# Patient Record
Sex: FEMALE | Race: BLACK OR AFRICAN AMERICAN | NOT HISPANIC OR LATINO | URBAN - METROPOLITAN AREA
[De-identification: names, ages, dates, MRNs, and addresses within clinical notes are randomized per-mention and may not be internally consistent; named-entity substitution may affect disease eponyms.]

---

## 2018-03-13 ENCOUNTER — APPOINTMENT (RX ONLY)
Dept: URBAN - METROPOLITAN AREA CLINIC 15 | Facility: CLINIC | Age: 23
Setting detail: DERMATOLOGY
End: 2018-03-13

## 2018-03-13 DIAGNOSIS — L81.0 POSTINFLAMMATORY HYPERPIGMENTATION: ICD-10-CM

## 2018-03-13 DIAGNOSIS — L73.9 FOLLICULAR DISORDER, UNSPECIFIED: ICD-10-CM

## 2018-03-13 DIAGNOSIS — L738 OTHER SPECIFIED DISEASES OF HAIR AND HAIR FOLLICLES: ICD-10-CM

## 2018-03-13 DIAGNOSIS — L70.0 ACNE VULGARIS: ICD-10-CM

## 2018-03-13 DIAGNOSIS — L663 OTHER SPECIFIED DISEASES OF HAIR AND HAIR FOLLICLES: ICD-10-CM

## 2018-03-13 PROBLEM — L85.3 XEROSIS CUTIS: Status: ACTIVE | Noted: 2018-03-13

## 2018-03-13 PROBLEM — L02.221 FURUNCLE OF ABDOMINAL WALL: Status: ACTIVE | Noted: 2018-03-13

## 2018-03-13 PROCEDURE — 99213 OFFICE O/P EST LOW 20 MIN: CPT

## 2018-03-13 PROCEDURE — ? COUNSELING

## 2018-03-13 PROCEDURE — ? PRESCRIPTION

## 2018-03-13 PROCEDURE — ? TREATMENT REGIMEN

## 2018-03-13 RX ORDER — HYDROCORTISONE 25 MG/G
CREAM TOPICAL
Qty: 1 | Refills: 1 | Status: ERX | COMMUNITY
Start: 2018-03-13

## 2018-03-13 RX ORDER — CLINDAMYCIN PHOSPHATE 10 MG/ML
SOLUTION TOPICAL
Qty: 1 | Refills: 2 | Status: ERX | COMMUNITY
Start: 2018-03-13

## 2018-03-13 RX ADMIN — HYDROCORTISONE: 25 CREAM TOPICAL at 00:00

## 2018-03-13 RX ADMIN — CLINDAMYCIN PHOSPHATE: 10 SOLUTION TOPICAL at 00:00

## 2018-03-13 ASSESSMENT — LOCATION SIMPLE DESCRIPTION DERM
LOCATION SIMPLE: RIGHT FOREHEAD
LOCATION SIMPLE: LEFT CHEEK
LOCATION SIMPLE: RIGHT CHEEK
LOCATION SIMPLE: CHIN
LOCATION SIMPLE: GROIN

## 2018-03-13 ASSESSMENT — LOCATION ZONE DERM
LOCATION ZONE: FACE
LOCATION ZONE: TRUNK

## 2018-03-13 ASSESSMENT — LOCATION DETAILED DESCRIPTION DERM
LOCATION DETAILED: LEFT MENTAL CREASE
LOCATION DETAILED: RIGHT INFERIOR CENTRAL MALAR CHEEK
LOCATION DETAILED: LEFT INFERIOR CENTRAL MALAR CHEEK
LOCATION DETAILED: LEFT SUPRAPUBIC SKIN
LOCATION DETAILED: RIGHT SUPRAPUBIC SKIN
LOCATION DETAILED: RIGHT MEDIAL FOREHEAD

## 2018-03-13 NOTE — PROCEDURE: COUNSELING
Topical Retinoid counseling:  Patient advised to apply a pea-sized amount only at bedtime and wait 30 minutes after washing their face before applying. If too drying, patient may add a non-comedogenic moisturizer. The patient verbalized understanding of the proper use and possible adverse effects of retinoids. All of the patient's questions and concerns were addressed.
Use Enhanced Medication Counseling?: No
Bactrim Counseling:  I discussed with the patient the risks of sulfa antibiotics including but not limited to GI upset, allergic reaction, drug rash, diarrhea, dizziness, photosensitivity, and yeast infections. Rarely, more serious reactions can occur including but not limited to aplastic anemia, agranulocytosis, methemoglobinemia, blood dyscrasias, liver or kidney failure, lung infiltrates or desquamative/blistering drug rashes.
Erythromycin Pregnancy And Lactation Text: This medication is Pregnancy Category B and is considered safe during pregnancy. It is also excreted in breast milk.
Doxycycline Pregnancy And Lactation Text: This medication is Pregnancy Category D and not consider safe during pregnancy. It is also excreted in breast milk but is considered safe for shorter treatment courses.
Doxycycline Counseling:  Patient counseled regarding possible photosensitivity and increased risk for sunburn. Patient instructed to avoid sunlight, if possible. When exposed to sunlight, patients should wear protective clothing, sunglasses, and sunscreen. The patient was instructed to call the office immediately if the following severe adverse effects occur:  hearing changes, easy bruising/bleeding, severe headache, or vision changes. The patient verbalized understanding of the proper use and possible adverse effects of doxycycline. All of the patient's questions and concerns were addressed.
Tetracycline Pregnancy And Lactation Text: This medication is Pregnancy Category D and not consider safe during pregnancy. It is also excreted in breast milk.
Benzoyl Peroxide Counseling: Patient counseled that medicine may cause skin irritation and bleach clothing. In the event of skin irritation, the patient was advised to reduce the amount of the drug applied or use it less frequently. The patient verbalized understanding of the proper use and possible adverse effects of benzoyl peroxide. All of the patient's questions and concerns were addressed.
Birth Control Pills Counseling: Birth Control Pill Counseling: I discussed with the patient the potential side effects of OCPs including but not limited to increased risk of stroke, heart attack, thrombophlebitis, deep venous thrombosis, hepatic adenomas, breast changes, GI upset, headaches, and depression. The patient verbalized understanding of the proper use and possible adverse effects of OCPs. All of the patient's questions and concerns were addressed.
Topical Sulfur Applications Pregnancy And Lactation Text: This medication is Pregnancy Category C and has an unknown safety profile during pregnancy. It is unknown if this topical medication is excreted in breast milk.
Isotretinoin Counseling: Patient should get monthly blood tests, not donate blood, not drive at night if vision affected, not share medication, and not undergo elective surgery for 6 months after tx completed. Side effects reviewed, pt to contact office should one occur.
Benzoyl Peroxide Pregnancy And Lactation Text: This medication is Pregnancy Category C. It is unknown if benzoyl peroxide is excreted in breast milk.
High Dose Vitamin A Counseling: Side effects reviewed, pt to contact office should one occur.
Azithromycin Counseling:  I discussed with the patient the risks of azithromycin including but not limited to GI upset, allergic reaction, drug rash, diarrhea, and yeast infections.
Dapsone Counseling: I discussed with the patient the risks of dapsone including but not limited to hemolytic anemia, agranulocytosis, rashes, methemoglobinemia, kidney failure, peripheral neuropathy, headaches, GI upset, and liver toxicity. Patients who start dapsone require monitoring including baseline LFTs and weekly CBCs for the first month, then every month thereafter. The patient verbalized understanding of the proper use and possible adverse effects of dapsone. All of the patient's questions and concerns were addressed.
Topical Clindamycin Counseling: Patient counseled that this medication may cause skin irritation or allergic reactions. In the event of skin irritation, the patient was advised to reduce the amount of the drug applied or use it less frequently. The patient verbalized understanding of the proper use and possible adverse effects of clindamycin. All of the patient's questions and concerns were addressed.
Isotretinoin Pregnancy And Lactation Text: This medication is Pregnancy Category X and is considered extremely dangerous during pregnancy. It is unknown if it is excreted in breast milk.
Tetracycline Counseling: Patient counseled regarding possible photosensitivity and increased risk for sunburn. Patient instructed to avoid sunlight, if possible. When exposed to sunlight, patients should wear protective clothing, sunglasses, and sunscreen. The patient was instructed to call the office immediately if the following severe adverse effects occur:  hearing changes, easy bruising/bleeding, severe headache, or vision changes. The patient verbalized understanding of the proper use and possible adverse effects of tetracycline. All of the patient's questions and concerns were addressed. Patient understands to avoid pregnancy while on therapy due to potential birth defects.
Azithromycin Pregnancy And Lactation Text: This medication is considered safe during pregnancy and is also secreted in breast milk.
Spironolactone Pregnancy And Lactation Text: This medication can cause feminization of the male fetus and should be avoided during pregnancy. The active metabolite is also found in breast milk.
Birth Control Pills Pregnancy And Lactation Text: This medication should be avoided if pregnant and for the first 30 days post-partum.
Topical Retinoid Pregnancy And Lactation Text: This medication is Pregnancy Category C. It is unknown if this medication is excreted in breast milk.
Topical Sulfur Applications Counseling: Topical Sulfur Counseling: Patient counseled that this medication may cause skin irritation or allergic reactions. In the event of skin irritation, the patient was advised to reduce the amount of the drug applied or use it less frequently. The patient verbalized understanding of the proper use and possible adverse effects of topical sulfur application. All of the patient's questions and concerns were addressed.
Bactrim Pregnancy And Lactation Text: This medication is Pregnancy Category D and is known to cause fetal risk. It is also excreted in breast milk.
Minocycline Counseling: Patient advised regarding possible photosensitivity and discoloration of the teeth, skin, lips, tongue and gums. Patient instructed to avoid sunlight, if possible. When exposed to sunlight, patients should wear protective clothing, sunglasses, and sunscreen. The patient was instructed to call the office immediately if the following severe adverse effects occur:  hearing changes, easy bruising/bleeding, severe headache, or vision changes. The patient verbalized understanding of the proper use and possible adverse effects of minocycline. All of the patient's questions and concerns were addressed.
Topical Clindamycin Pregnancy And Lactation Text: This medication is Pregnancy Category B and is considered safe during pregnancy. It is unknown if it is excreted in breast milk.
Erythromycin Counseling:  I discussed with the patient the risks of erythromycin including but not limited to GI upset, allergic reaction, drug rash, diarrhea, increase in liver enzymes, and yeast infections.
Detail Level: Zone
Tazorac Counseling:  Patient advised that medication is irritating and drying. Patient may need to apply sparingly and wash off after an hour before eventually leaving it on overnight. The patient verbalized understanding of the proper use and possible adverse effects of tazorac. All of the patient's questions and concerns were addressed.
Spironolactone Counseling: Patient advised regarding risks of diarrhea, abdominal pain, hyperkalemia, birth defects (for female patients), liver toxicity and renal toxicity. The patient may need blood work to monitor liver and kidney function and potassium levels while on therapy. The patient verbalized understanding of the proper use and possible adverse effects of spironolactone. All of the patient's questions and concerns were addressed.
Tazorac Pregnancy And Lactation Text: This medication is not safe during pregnancy. It is unknown if this medication is excreted in breast milk.
Dapsone Pregnancy And Lactation Text: This medication is Pregnancy Category C and is not considered safe during pregnancy or breast feeding.
High Dose Vitamin A Pregnancy And Lactation Text: High dose vitamin A therapy is contraindicated during pregnancy and breast feeding.
Detail Level: Simple

## 2018-03-13 NOTE — HPI: PIMPLES (ACNE)
How Severe Is Your Acne?: mild
Is This A New Presentation, Or A Follow-Up?: Follow Up Acne
Additional Comments (Use Complete Sentences): Last time patient was here was on 7/18/2016

## 2018-03-13 NOTE — HPI: INFECTION (FOLLICULITIS)
How Severe Is It?: mild
Is This A New Presentation, Or A Follow-Up?: Follow Up Folliculitis
Additional History: Last time patient was here 7/18/2016

## 2018-03-13 NOTE — PROCEDURE: TREATMENT REGIMEN
Detail Level: Zone
Continue Regimen: .\\nSun protection daily. \\n\\nMild cleanser at night. \\nSmoothing Retinol 2X at night.
Initiate Treatment: .\\nHydrocortisone 2.5 % cream every morning for 2 weeks. Use it every other 2 weeks. \\nSun protection daily \\n\\nMild cleanser at night \\nAdvanced Skin Brightening 8 % cream at night \\nSmoothing Retinol 2 X at night.

## 2018-04-04 ENCOUNTER — RX ONLY (OUTPATIENT)
Age: 23
Setting detail: RX ONLY
End: 2018-04-04

## 2018-04-04 RX ORDER — FLUTICASONE PROPIONATE 0.5 MG/ML
LOTION TOPICAL
Qty: 1 | Refills: 0 | Status: ERX | COMMUNITY
Start: 2018-04-04

## 2018-04-06 ENCOUNTER — RX ONLY (OUTPATIENT)
Age: 23
Setting detail: RX ONLY
End: 2018-04-06

## 2018-04-06 RX ORDER — FLUTICASONE PROPIONATE 0.5 MG/ML
LOTION TOPICAL
Qty: 1 | Refills: 0 | Status: ERX

## 2021-05-04 ENCOUNTER — APPOINTMENT (RX ONLY)
Dept: URBAN - METROPOLITAN AREA CLINIC 15 | Facility: CLINIC | Age: 26
Setting detail: DERMATOLOGY
End: 2021-05-04

## 2021-05-04 VITALS — WEIGHT: 120 LBS | HEIGHT: 56 IN

## 2021-05-04 DIAGNOSIS — L81.0 POSTINFLAMMATORY HYPERPIGMENTATION: ICD-10-CM

## 2021-05-04 DIAGNOSIS — L70.0 ACNE VULGARIS: ICD-10-CM

## 2021-05-04 PROCEDURE — ? COUNSELING

## 2021-05-04 PROCEDURE — ? PRESCRIPTION MEDICATION MANAGEMENT

## 2021-05-04 PROCEDURE — 99204 OFFICE O/P NEW MOD 45 MIN: CPT

## 2021-05-04 PROCEDURE — ? PRESCRIPTION

## 2021-05-04 RX ORDER — TRIFAROTENE 50 UG/G
1 CREAM TOPICAL QHS
Qty: 45 | Refills: 3 | Status: ERX | COMMUNITY
Start: 2021-05-04

## 2021-05-04 RX ORDER — PHARMACY COMPOUNDING ACCESSORY
1 EACH MISCELLANEOUS QHS
Qty: 1 | Refills: 0 | Status: ERX | COMMUNITY
Start: 2021-05-04

## 2021-05-04 RX ADMIN — Medication 1: at 00:00

## 2021-05-04 RX ADMIN — TRIFAROTENE 1: 50 CREAM TOPICAL at 00:00

## 2021-05-04 ASSESSMENT — LOCATION SIMPLE DESCRIPTION DERM
LOCATION SIMPLE: RIGHT CHEEK
LOCATION SIMPLE: LEFT CHEEK

## 2021-05-04 ASSESSMENT — LOCATION ZONE DERM: LOCATION ZONE: FACE

## 2021-05-04 ASSESSMENT — LOCATION DETAILED DESCRIPTION DERM
LOCATION DETAILED: LEFT INFERIOR CENTRAL MALAR CHEEK
LOCATION DETAILED: RIGHT CENTRAL MALAR CHEEK

## 2021-05-04 NOTE — PROCEDURE: PRESCRIPTION MEDICATION MANAGEMENT
Initiate Treatment: Keith Everts face with Skinceuticals simply clean gel or La roche posay purifying foaming cleanser \\n-Apply Skinceuticals metacel renewal b3 to the entire face or CeraVe PM \\n-Apply SPF (recommend Colorscience flex) \\n\\nPM\\n-Wash face with Skinceuticals simply clean gel or [place] roche posay purifying foaming cleanser \\n-Apply Skinceuticals metacel renewal b3 to the entire face or CeraVe PM \\n-Apply pea sized amount of Aklief ([address]) cream to the entire face every other night, gradually increase as tolerated (avoid eyelid area and corners of the mouth) \\n-Apply HQ 8%, NA 5%, KA 3%, TA 5% cream to the entire face every night ([facility]) \\n\\n\\n*drosperinone based birth control, patient will discuss starting with GYN*\\n**Avoid foods high in dairy and glycemic index**
Discontinue Regimen: .\\n-Sunday Vernida Carlos products
Plan: .\\n-Vi peel precision plus $300 once a month.  Discontinue all acne/pigmentation medications 3-5 days prior to appointment
Render In Strict Bullet Format?: No
Detail Level: Simple
Initiate Treatment: .\\n-Apply HQ 8%, NA 5%, KA 3%, TA 5% cream to the entire face every night ([facility])

## 2021-05-04 NOTE — HPI: PIMPLES (ACNE)
What Type Of Note Output Would You Prefer (Optional)?: Standard Output
How Severe Is Your Acne?: moderate
Is This A New Presentation, Or A Follow-Up?: Acne
Females Only: When Was Your Last Menstrual Period?: 4/2021

## 2021-05-04 NOTE — PROCEDURE: COUNSELING
Bleaching Agents Recommendations: Compound HQ cream
Detail Level: Zone
Sunscreen Recommendations: Daily sun screen with at least
Erythromycin Counseling:  I discussed with the patient the risks of erythromycin including but not limited to GI upset, allergic reaction, drug rash, diarrhea, increase in liver enzymes, and yeast infections.
Dapsone Counseling: I discussed with the patient the risks of dapsone including but not limited to hemolytic anemia, agranulocytosis, rashes, methemoglobinemia, kidney failure, peripheral neuropathy, headaches, GI upset, and liver toxicity. Patients who start dapsone require monitoring including baseline LFTs and weekly CBCs for the first month, then every month thereafter. The patient verbalized understanding of the proper use and possible adverse effects of dapsone. All of the patient's questions and concerns were addressed.
Topical Sulfur Applications Pregnancy And Lactation Text: This medication is Pregnancy Category C and has an unknown safety profile during pregnancy. It is unknown if this topical medication is excreted in breast milk.
High Dose Vitamin A Counseling: Side effects reviewed, pt to contact office should one occur.
Tazorac Pregnancy And Lactation Text: This medication is not safe during pregnancy. It is unknown if this medication is excreted in breast milk.
Tetracycline Counseling: Patient counseled regarding possible photosensitivity and increased risk for sunburn. Patient instructed to avoid sunlight, if possible. When exposed to sunlight, patients should wear protective clothing, sunglasses, and sunscreen. The patient was instructed to call the office immediately if the following severe adverse effects occur:  hearing changes, easy bruising/bleeding, severe headache, or vision changes. The patient verbalized understanding of the proper use and possible adverse effects of tetracycline. All of the patient's questions and concerns were addressed. Patient understands to avoid pregnancy while on therapy due to potential birth defects.
Bactrim Counseling:  I discussed with the patient the risks of sulfa antibiotics including but not limited to GI upset, allergic reaction, drug rash, diarrhea, dizziness, photosensitivity, and yeast infections. Rarely, more serious reactions can occur including but not limited to aplastic anemia, agranulocytosis, methemoglobinemia, blood dyscrasias, liver or kidney failure, lung infiltrates or desquamative/blistering drug rashes.
Topical Retinoid counseling:  Patient advised to apply a pea-sized amount only at bedtime and wait 30 minutes after washing their face before applying. If too drying, patient may add a non-comedogenic moisturizer. The patient verbalized understanding of the proper use and possible adverse effects of retinoids. All of the patient's questions and concerns were addressed.
Sarecycline Counseling: Patient advised regarding possible photosensitivity and discoloration of the teeth, skin, lips, tongue and gums. Patient instructed to avoid sunlight, if possible. When exposed to sunlight, patients should wear protective clothing, sunglasses, and sunscreen. The patient was instructed to call the office immediately if the following severe adverse effects occur:  hearing changes, easy bruising/bleeding, severe headache, or vision changes. The patient verbalized understanding of the proper use and possible adverse effects of sarecycline. All of the patient's questions and concerns were addressed.
Moisturizer Recommendations: Niacinamide.
Sarecycline Pregnancy And Lactation Text: This medication is Pregnancy Category D and not consider safe during pregnancy. It is also excreted in breast milk.
High Dose Vitamin A Pregnancy And Lactation Text: High dose vitamin A therapy is contraindicated during pregnancy and breast feeding.
Dapsone Pregnancy And Lactation Text: This medication is Pregnancy Category C and is not considered safe during pregnancy or breast feeding.
Bactrim Pregnancy And Lactation Text: This medication is Pregnancy Category D and is known to cause fetal risk. It is also excreted in breast milk.
Topical Clindamycin Counseling: Patient counseled that this medication may cause skin irritation or allergic reactions. In the event of skin irritation, the patient was advised to reduce the amount of the drug applied or use it less frequently. The patient verbalized understanding of the proper use and possible adverse effects of clindamycin. All of the patient's questions and concerns were addressed.
Topical Retinoid Pregnancy And Lactation Text: This medication is Pregnancy Category C. It is unknown if this medication is excreted in breast milk.
Erythromycin Pregnancy And Lactation Text: This medication is Pregnancy Category B and is considered safe during pregnancy. It is also excreted in breast milk.
Doxycycline Counseling:  Patient counseled regarding possible photosensitivity and increased risk for sunburn. Patient instructed to avoid sunlight, if possible. When exposed to sunlight, patients should wear protective clothing, sunglasses, and sunscreen. The patient was instructed to call the office immediately if the following severe adverse effects occur:  hearing changes, easy bruising/bleeding, severe headache, or vision changes. The patient verbalized understanding of the proper use and possible adverse effects of doxycycline. All of the patient's questions and concerns were addressed.
Isotretinoin Counseling: Patient should get monthly blood tests, not donate blood, not drive at night if vision affected, not share medication, and not undergo elective surgery for 6 months after tx completed. Side effects reviewed, pt to contact office should one occur.
Birth Control Pills Counseling: Birth Control Pill Counseling: I discussed with the patient the potential side effects of OCPs including but not limited to increased risk of stroke, heart attack, thrombophlebitis, deep venous thrombosis, hepatic adenomas, breast changes, GI upset, headaches, and depression. The patient verbalized understanding of the proper use and possible adverse effects of OCPs. All of the patient's questions and concerns were addressed.
Topical Clindamycin Pregnancy And Lactation Text: This medication is Pregnancy Category B and is considered safe during pregnancy. It is unknown if it is excreted in breast milk.
Azithromycin Counseling:  I discussed with the patient the risks of azithromycin including but not limited to GI upset, allergic reaction, drug rash, diarrhea, and yeast infections.
Tazorac Counseling:  Patient advised that medication is irritating and drying. Patient may need to apply sparingly and wash off after an hour before eventually leaving it on overnight. The patient verbalized understanding of the proper use and possible adverse effects of tazorac. All of the patient's questions and concerns were addressed.
Topical Retinoids Recommendations: .Tretinoin cream
Benzoyl Peroxide Counseling: Patient counseled that medicine may cause skin irritation and bleach clothing. In the event of skin irritation, the patient was advised to reduce the amount of the drug applied or use it less frequently. The patient verbalized understanding of the proper use and possible adverse effects of benzoyl peroxide. All of the patient's questions and concerns were addressed.
Minocycline Counseling: Patient advised regarding possible photosensitivity and discoloration of the teeth, skin, lips, tongue and gums. Patient instructed to avoid sunlight, if possible. When exposed to sunlight, patients should wear protective clothing, sunglasses, and sunscreen. The patient was instructed to call the office immediately if the following severe adverse effects occur:  hearing changes, easy bruising/bleeding, severe headache, or vision changes. The patient verbalized understanding of the proper use and possible adverse effects of minocycline. All of the patient's questions and concerns were addressed.
Bpo Recommendations: .\\nPanoxyl liquid soap for the back \\n.
Spironolactone Counseling: Patient advised regarding risks of diarrhea, abdominal pain, hyperkalemia, birth defects (for female patients), liver toxicity and renal toxicity. The patient may need blood work to monitor liver and kidney function and potassium levels while on therapy. The patient verbalized understanding of the proper use and possible adverse effects of spironolactone. All of the patient's questions and concerns were addressed.
Azithromycin Pregnancy And Lactation Text: This medication is considered safe during pregnancy and is also secreted in breast milk.
Spironolactone Pregnancy And Lactation Text: This medication can cause feminization of the male fetus and should be avoided during pregnancy. The active metabolite is also found in breast milk.
Topical Sulfur Applications Counseling: Topical Sulfur Counseling: Patient counseled that this medication may cause skin irritation or allergic reactions. In the event of skin irritation, the patient was advised to reduce the amount of the drug applied or use it less frequently. The patient verbalized understanding of the proper use and possible adverse effects of topical sulfur application. All of the patient's questions and concerns were addressed.
Include Pregnancy/Lactation Warning?: No
Birth Control Pills Pregnancy And Lactation Text: This medication should be avoided if pregnant and for the first 30 days post-partum.
Cleanser Recommendations: Cerave hydrating cleanser\\nCerave foaming cleanser
Isotretinoin Pregnancy And Lactation Text: This medication is Pregnancy Category X and is considered extremely dangerous during pregnancy. It is unknown if it is excreted in breast milk.
Doxycycline Pregnancy And Lactation Text: This medication is Pregnancy Category D and not consider safe during pregnancy. It is also excreted in breast milk but is considered safe for shorter treatment courses.
Benzoyl Peroxide Pregnancy And Lactation Text: This medication is Pregnancy Category C. It is unknown if benzoyl peroxide is excreted in breast milk.

## 2021-07-20 ENCOUNTER — APPOINTMENT (RX ONLY)
Dept: URBAN - METROPOLITAN AREA CLINIC 15 | Facility: CLINIC | Age: 26
Setting detail: DERMATOLOGY
End: 2021-07-20

## 2021-07-20 VITALS — WEIGHT: 120 LBS | HEIGHT: 56 IN

## 2021-07-20 DIAGNOSIS — L70.0 ACNE VULGARIS: ICD-10-CM

## 2021-07-20 DIAGNOSIS — L81.0 POSTINFLAMMATORY HYPERPIGMENTATION: ICD-10-CM

## 2021-07-20 DIAGNOSIS — L85.8 OTHER SPECIFIED EPIDERMAL THICKENING: ICD-10-CM

## 2021-07-20 DIAGNOSIS — L90.5 SCAR CONDITIONS AND FIBROSIS OF SKIN: ICD-10-CM

## 2021-07-20 PROCEDURE — ? DIAGNOSIS COMMENT

## 2021-07-20 PROCEDURE — 99214 OFFICE O/P EST MOD 30 MIN: CPT

## 2021-07-20 PROCEDURE — ? RECOMMENDATIONS

## 2021-07-20 PROCEDURE — ? TREATMENT REGIMEN

## 2021-07-20 PROCEDURE — ? PRESCRIPTION MEDICATION MANAGEMENT

## 2021-07-20 PROCEDURE — ? COUNSELING

## 2021-07-20 ASSESSMENT — LOCATION DETAILED DESCRIPTION DERM
LOCATION DETAILED: LEFT INFERIOR CENTRAL MALAR CHEEK
LOCATION DETAILED: RIGHT CENTRAL MALAR CHEEK
LOCATION DETAILED: RIGHT PROXIMAL DORSAL FOREARM
LOCATION DETAILED: RIGHT INFERIOR LATERAL MALAR CHEEK
LOCATION DETAILED: RIGHT MEDIAL FOREHEAD
LOCATION DETAILED: LEFT KNEE
LOCATION DETAILED: RIGHT KNEE
LOCATION DETAILED: RIGHT CHIN
LOCATION DETAILED: LEFT ELBOW
LOCATION DETAILED: RIGHT ELBOW
LOCATION DETAILED: LEFT DISTAL DORSAL FOREARM

## 2021-07-20 ASSESSMENT — LOCATION ZONE DERM
LOCATION ZONE: FACE
LOCATION ZONE: ARM
LOCATION ZONE: LEG

## 2021-07-20 ASSESSMENT — LOCATION SIMPLE DESCRIPTION DERM
LOCATION SIMPLE: RIGHT CHEEK
LOCATION SIMPLE: RIGHT FOREHEAD
LOCATION SIMPLE: LEFT FOREARM
LOCATION SIMPLE: RIGHT KNEE
LOCATION SIMPLE: LEFT ELBOW
LOCATION SIMPLE: LEFT KNEE
LOCATION SIMPLE: CHIN
LOCATION SIMPLE: RIGHT FOREARM
LOCATION SIMPLE: RIGHT ELBOW
LOCATION SIMPLE: LEFT CHEEK

## 2021-07-20 NOTE — PROCEDURE: PRESCRIPTION MEDICATION MANAGEMENT
Discontinue Regimen: .\\n-Sunday Russel lamb
Plan: .\\n-Vi peel precision plus $300 once a month. Discontinue all acne/pigmentation medications 3-5 days prior to appointment.  May need 3-6 treatments to see maximum improvement
Render In Strict Bullet Format?: No
Detail Level: Simple
Continue Regimen: Kevon Aye face with Skinceuticals simply clean gel or La roche posay purifying foaming cleanser \\n-Apply Skinceuticals metacel renewal b3 to the entire face or CeraVe PM \\n-Apply SPF (recommend Colorscience flex) \\n\\nPM\\n-Wash face with Skinceuticals simply clean gel or [place] ro[place] posay purifying foaming cleanser \\n-Apply Skinceuticals metacel renewal b3 to the entire face or CeraVe PM \\n-Apply pea sized amount of Aklief ([address]) cream to the entire face every other night, gradually increase as tolerated (avoid eyelid area and corners of the mouth). Discussed starting to increase until applying every night \\n-Apply HQ 8%, NA 5%, KA 3%, TA 5% cream to the entire face every night ([facility]  Start increasing application until applying every night \\n\\n\\n*drosperinone based birth control, patient will discuss starting with GYN*\\n**Avoid foods high in dairy and glycemic index**
Initiate Treatment: .\\n-Spot treat with HQ 8% (patient has RX) \\n-Strict SPF

## 2021-07-20 NOTE — PROCEDURE: TREATMENT REGIMEN
Initiate Treatment: .\\n-CeraVe SA lotion daily \\n-Glytone heel and elbow at twice a week, apply for 30 minutes, then rinse
Detail Level: Simple

## 2021-07-20 NOTE — PROCEDURE: DIAGNOSIS COMMENT
Comment: .\\n-Secondary to arthropod assault and acne
Detail Level: Detailed
Render Risk Assessment In Note?: no

## 2021-07-20 NOTE — PROCEDURE: RECOMMENDATIONS
Recommendation Preamble: Recommendation:
Detail Level: Zone
Recommendations (Free Text): .\\n-Recommend monthly facials for the nose
Render Risk Assessment In Note?: no

## 2023-04-25 NOTE — PROCEDURE: COUNSELING
Erythromycin Counseling:  I discussed with the patient the risks of erythromycin including but not limited to GI upset, allergic reaction, drug rash, diarrhea, increase in liver enzymes, and yeast infections.
Dapsone Counseling: I discussed with the patient the risks of dapsone including but not limited to hemolytic anemia, agranulocytosis, rashes, methemoglobinemia, kidney failure, peripheral neuropathy, headaches, GI upset, and liver toxicity. Patients who start dapsone require monitoring including baseline LFTs and weekly CBCs for the first month, then every month thereafter. The patient verbalized understanding of the proper use and possible adverse effects of dapsone. All of the patient's questions and concerns were addressed.
Topical Sulfur Applications Pregnancy And Lactation Text: This medication is Pregnancy Category C and has an unknown safety profile during pregnancy. It is unknown if this topical medication is excreted in breast milk.
High Dose Vitamin A Counseling: Side effects reviewed, pt to contact office should one occur.
[Negative] : Genitourinary
Tazorac Pregnancy And Lactation Text: This medication is not safe during pregnancy. It is unknown if this medication is excreted in breast milk.
Tetracycline Counseling: Patient counseled regarding possible photosensitivity and increased risk for sunburn. Patient instructed to avoid sunlight, if possible. When exposed to sunlight, patients should wear protective clothing, sunglasses, and sunscreen. The patient was instructed to call the office immediately if the following severe adverse effects occur:  hearing changes, easy bruising/bleeding, severe headache, or vision changes. The patient verbalized understanding of the proper use and possible adverse effects of tetracycline. All of the patient's questions and concerns were addressed. Patient understands to avoid pregnancy while on therapy due to potential birth defects.
Bactrim Counseling:  I discussed with the patient the risks of sulfa antibiotics including but not limited to GI upset, allergic reaction, drug rash, diarrhea, dizziness, photosensitivity, and yeast infections. Rarely, more serious reactions can occur including but not limited to aplastic anemia, agranulocytosis, methemoglobinemia, blood dyscrasias, liver or kidney failure, lung infiltrates or desquamative/blistering drug rashes.
Topical Retinoid counseling:  Patient advised to apply a pea-sized amount only at bedtime and wait 30 minutes after washing their face before applying. If too drying, patient may add a non-comedogenic moisturizer. The patient verbalized understanding of the proper use and possible adverse effects of retinoids. All of the patient's questions and concerns were addressed.
Sarecycline Counseling: Patient advised regarding possible photosensitivity and discoloration of the teeth, skin, lips, tongue and gums. Patient instructed to avoid sunlight, if possible. When exposed to sunlight, patients should wear protective clothing, sunglasses, and sunscreen. The patient was instructed to call the office immediately if the following severe adverse effects occur:  hearing changes, easy bruising/bleeding, severe headache, or vision changes. The patient verbalized understanding of the proper use and possible adverse effects of sarecycline. All of the patient's questions and concerns were addressed.
Moisturizer Recommendations: Niacinamide.
Sarecycline Pregnancy And Lactation Text: This medication is Pregnancy Category D and not consider safe during pregnancy. It is also excreted in breast milk.
High Dose Vitamin A Pregnancy And Lactation Text: High dose vitamin A therapy is contraindicated during pregnancy and breast feeding.
Dapsone Pregnancy And Lactation Text: This medication is Pregnancy Category C and is not considered safe during pregnancy or breast feeding.
Bactrim Pregnancy And Lactation Text: This medication is Pregnancy Category D and is known to cause fetal risk. It is also excreted in breast milk.
Topical Clindamycin Counseling: Patient counseled that this medication may cause skin irritation or allergic reactions. In the event of skin irritation, the patient was advised to reduce the amount of the drug applied or use it less frequently. The patient verbalized understanding of the proper use and possible adverse effects of clindamycin. All of the patient's questions and concerns were addressed.
Topical Retinoid Pregnancy And Lactation Text: This medication is Pregnancy Category C. It is unknown if this medication is excreted in breast milk.
Erythromycin Pregnancy And Lactation Text: This medication is Pregnancy Category B and is considered safe during pregnancy. It is also excreted in breast milk.
Doxycycline Counseling:  Patient counseled regarding possible photosensitivity and increased risk for sunburn. Patient instructed to avoid sunlight, if possible. When exposed to sunlight, patients should wear protective clothing, sunglasses, and sunscreen. The patient was instructed to call the office immediately if the following severe adverse effects occur:  hearing changes, easy bruising/bleeding, severe headache, or vision changes. The patient verbalized understanding of the proper use and possible adverse effects of doxycycline. All of the patient's questions and concerns were addressed.
Isotretinoin Counseling: Patient should get monthly blood tests, not donate blood, not drive at night if vision affected, not share medication, and not undergo elective surgery for 6 months after tx completed. Side effects reviewed, pt to contact office should one occur.
Birth Control Pills Counseling: Birth Control Pill Counseling: I discussed with the patient the potential side effects of OCPs including but not limited to increased risk of stroke, heart attack, thrombophlebitis, deep venous thrombosis, hepatic adenomas, breast changes, GI upset, headaches, and depression. The patient verbalized understanding of the proper use and possible adverse effects of OCPs. All of the patient's questions and concerns were addressed.
Topical Clindamycin Pregnancy And Lactation Text: This medication is Pregnancy Category B and is considered safe during pregnancy. It is unknown if it is excreted in breast milk.
Azithromycin Counseling:  I discussed with the patient the risks of azithromycin including but not limited to GI upset, allergic reaction, drug rash, diarrhea, and yeast infections.
Tazorac Counseling:  Patient advised that medication is irritating and drying. Patient may need to apply sparingly and wash off after an hour before eventually leaving it on overnight. The patient verbalized understanding of the proper use and possible adverse effects of tazorac. All of the patient's questions and concerns were addressed.
Topical Retinoids Recommendations: .Tretinoin cream
Benzoyl Peroxide Counseling: Patient counseled that medicine may cause skin irritation and bleach clothing. In the event of skin irritation, the patient was advised to reduce the amount of the drug applied or use it less frequently. The patient verbalized understanding of the proper use and possible adverse effects of benzoyl peroxide. All of the patient's questions and concerns were addressed.
Minocycline Counseling: Patient advised regarding possible photosensitivity and discoloration of the teeth, skin, lips, tongue and gums. Patient instructed to avoid sunlight, if possible. When exposed to sunlight, patients should wear protective clothing, sunglasses, and sunscreen. The patient was instructed to call the office immediately if the following severe adverse effects occur:  hearing changes, easy bruising/bleeding, severe headache, or vision changes. The patient verbalized understanding of the proper use and possible adverse effects of minocycline. All of the patient's questions and concerns were addressed.
Bpo Recommendations: .\\nPanoxyl liquid soap for the back \\n.
Spironolactone Counseling: Patient advised regarding risks of diarrhea, abdominal pain, hyperkalemia, birth defects (for female patients), liver toxicity and renal toxicity. The patient may need blood work to monitor liver and kidney function and potassium levels while on therapy. The patient verbalized understanding of the proper use and possible adverse effects of spironolactone. All of the patient's questions and concerns were addressed.
Azithromycin Pregnancy And Lactation Text: This medication is considered safe during pregnancy and is also secreted in breast milk.
Spironolactone Pregnancy And Lactation Text: This medication can cause feminization of the male fetus and should be avoided during pregnancy. The active metabolite is also found in breast milk.
Topical Sulfur Applications Counseling: Topical Sulfur Counseling: Patient counseled that this medication may cause skin irritation or allergic reactions. In the event of skin irritation, the patient was advised to reduce the amount of the drug applied or use it less frequently. The patient verbalized understanding of the proper use and possible adverse effects of topical sulfur application. All of the patient's questions and concerns were addressed.
Include Pregnancy/Lactation Warning?: No
Birth Control Pills Pregnancy And Lactation Text: This medication should be avoided if pregnant and for the first 30 days post-partum.
Cleanser Recommendations: Cerave hydrating cleanser\\nCerave foaming cleanser
Isotretinoin Pregnancy And Lactation Text: This medication is Pregnancy Category X and is considered extremely dangerous during pregnancy. It is unknown if it is excreted in breast milk.
Doxycycline Pregnancy And Lactation Text: This medication is Pregnancy Category D and not consider safe during pregnancy. It is also excreted in breast milk but is considered safe for shorter treatment courses.
Benzoyl Peroxide Pregnancy And Lactation Text: This medication is Pregnancy Category C. It is unknown if benzoyl peroxide is excreted in breast milk.
Detail Level: Zone
Sunscreen Recommendations: Daily sunscreen with at least SPF [address] [address] [place]
Bleaching Agents Recommendations: Compound HQ cream
Detail Level: Detailed

## 2023-08-15 ENCOUNTER — APPOINTMENT (OUTPATIENT)
Dept: URBAN - METROPOLITAN AREA CLINIC 207 | Age: 28
Setting detail: DERMATOLOGY
End: 2023-08-26

## 2023-08-15 DIAGNOSIS — L81.0 POSTINFLAMMATORY HYPERPIGMENTATION: ICD-10-CM

## 2023-08-15 DIAGNOSIS — L70.0 ACNE VULGARIS: ICD-10-CM

## 2023-08-15 PROBLEM — D23.39 OTHER BENIGN NEOPLASM OF SKIN OF OTHER PARTS OF FACE: Status: ACTIVE | Noted: 2023-08-15

## 2023-08-15 PROCEDURE — 99204 OFFICE O/P NEW MOD 45 MIN: CPT

## 2023-08-15 PROCEDURE — OTHER ADDITIONAL NOTES: OTHER

## 2023-08-15 PROCEDURE — OTHER COUNSELING: OTHER

## 2023-08-15 PROCEDURE — OTHER PRESCRIPTION MEDICATION MANAGEMENT: OTHER

## 2023-08-15 PROCEDURE — OTHER PRESCRIPTION: OTHER

## 2023-08-15 RX ORDER — SPIRONOLACTONE 50 MG/1
TABLET, FILM COATED ORAL
Qty: 60 | Refills: 3 | Status: ERX | COMMUNITY
Start: 2023-08-15

## 2023-08-15 RX ORDER — TRETIONIN 0.25 MG/G
CREAM TOPICAL
Qty: 20 | Refills: 5 | Status: ERX | COMMUNITY
Start: 2023-08-15

## 2023-08-15 ASSESSMENT — LOCATION SIMPLE DESCRIPTION DERM
LOCATION SIMPLE: LEFT CHEEK
LOCATION SIMPLE: LEFT FOREHEAD
LOCATION SIMPLE: RIGHT CHEEK

## 2023-08-15 ASSESSMENT — LOCATION DETAILED DESCRIPTION DERM
LOCATION DETAILED: LEFT INFERIOR CENTRAL MALAR CHEEK
LOCATION DETAILED: RIGHT INFERIOR CENTRAL MALAR CHEEK
LOCATION DETAILED: LEFT CENTRAL MALAR CHEEK
LOCATION DETAILED: LEFT MEDIAL FOREHEAD

## 2023-08-15 ASSESSMENT — LOCATION ZONE DERM: LOCATION ZONE: FACE

## 2023-08-15 NOTE — PROCEDURE: COUNSELING
Birth Control Pills Counseling: Birth Control Pill Counseling: I discussed with the patient the potential side effects of OCPs including but not limited to increased risk of stroke, heart attack, thrombophlebitis, deep venous thrombosis, hepatic adenomas, breast changes, GI upset, headaches, changes in libido,  and depression. There is a slight increase risk of breast cancer by 7% overall,( 20-30% higher while taking BCPs, and returning to 0% increased risk within 5 years.) ref: Yumiko Loyd breast cancer site.\\n\\n Advantages of BCPs include regulating periods and menstrual flow, can make periods lighter, and decrease the discomfort of menstrual cramps, decrease risk of osteoporosis, anemia, vaginal dryness, and body hair.  They can decrease the risk of ovarian cancer by 27% and endometrial cancer by 50%. The patient verbalized understanding of the proper use and possible adverse effects of OCPs. All of the patient's questions and concerns were addressed.\\n\\n\\nBcp vs antibiotics:\\Rhoda brand of birth control pills and the Nuvaring improve acne after 4 months as effectively as antibiotics per studies in [facility]. In this age of multi drug resistant bacteria, dermatologists are encouraged to discontinue antibiotics after 3-4 months.  Spironolactone, a hormone receptor blocker is another alternative to both therapies,  There are both medical advantages and side effects of birth control pills that need to be evaluated with your health care provider. Contraindications including women who smoke, with histories of blood clots , migraines, estrogen positive breast cancer or other medical conditions. Age greater than 35 increases risk of all the above risks Birth Control Pills Counseling: Birth Control Pill Counseling: I discussed with the patient the potential side effects of OCPs including but not limited to increased risk of stroke, heart attack, thrombophlebitis, deep venous thrombosis, hepatic adenomas, breast changes, GI upset, headaches, changes in libido,  and depression. There is a slight increase risk of breast cancer by 7% overall,( 20-30% higher while taking BCPs, and returning to 0% increased risk within 5 years.) ref: Yumiko Loyd breast cancer site.\\n\\n Advantages of BCPs include regulating periods and menstrual flow, can make periods lighter, and decrease the discomfort of menstrual cramps, decrease risk of osteoporosis, anemia, vaginal dryness, and body hair.  They can decrease the risk of ovarian cancer by 27% and endometrial cancer by 50%. The patient verbalized understanding of the proper use and possible adverse effects of OCPs. All of the patient's questions and concerns were addressed.\\n\\n\\nBcp vs antibiotics:\\Rhoda brand of birth control pills and the Nuvaring improve acne after 4 months as effectively as antibiotics per studies in [facility]. In this age of multi drug resistant bacteria, dermatologists are encouraged to discontinue antibiotics after 3-4 months.  Spironolactone, a hormone receptor blocker is another alternative to both therapies,  There are both medical advantages and side effects of birth control pills that need to be evaluated with your health care provider. Contraindications including women who smoke, with histories of blood clots , migraines, estrogen positive breast cancer or other medical conditions. Age greater than 35 increases risk of all the above risks

## 2023-08-15 NOTE — PROCEDURE: ADDITIONAL NOTES
Render Risk Assessment In Note?: no
Additional Notes: Recommends peel in the fall
Detail Level: Simple

## 2023-08-15 NOTE — HPI: PIMPLES (ACNE)
How Severe Is Your Acne?: moderate
Is This A New Presentation, Or A Follow-Up?: Acne
Additional Comments (Use Complete Sentences): In morning patient washes her face with La Roche posay and then snail muslin products, and various other acne products
Females Only: When Was Your Last Menstrual Period?: 7/24/23

## 2023-08-15 NOTE — PROCEDURE: PRESCRIPTION MEDICATION MANAGEMENT
Detail Level: Zone
Initiate Treatment: spironolactone 50 mg tablet 3 tablets by mouth QD\\nQuantity: 60.0 Tablet  Days Supply: 30\\nSig: Take 1-2 tablet by mouth at night and drink plenty of water throughout the day\\n\\ntretinoin 0.025 % topical cream \\nQuantity: 20.0 g  Days Supply: 30\\nSig: Apply a pea sized amount to face at night
Plan: - in morning wash face with La Roche posay , incho water then snail mucin (let dry) apply La Roche posay salicylic acid then sunscreen \\n-at night Wash face with La Roche posay cleanser, nicho water then snail mucin (let dry) apply tretinoin and then moisturizer, Spironolactone 1-2 tablets at night \\nMay use Nizoral 3 times a week
Render In Strict Bullet Format?: No

## 2023-09-05 ENCOUNTER — APPOINTMENT (OUTPATIENT)
Dept: URBAN - METROPOLITAN AREA CLINIC 207 | Age: 28
Setting detail: DERMATOLOGY
End: 2023-09-05

## 2023-09-05 DIAGNOSIS — L81.0 POSTINFLAMMATORY HYPERPIGMENTATION: ICD-10-CM

## 2023-09-05 DIAGNOSIS — L70.0 ACNE VULGARIS: ICD-10-CM

## 2023-09-05 PROCEDURE — OTHER COUNSELING: OTHER

## 2023-09-05 PROCEDURE — OTHER MIPS QUALITY: OTHER

## 2023-09-05 PROCEDURE — OTHER ADDITIONAL NOTES: OTHER

## 2023-09-05 PROCEDURE — OTHER PRESCRIPTION: OTHER

## 2023-09-05 PROCEDURE — 99214 OFFICE O/P EST MOD 30 MIN: CPT

## 2023-09-05 PROCEDURE — OTHER PRESCRIPTION MEDICATION MANAGEMENT: OTHER

## 2023-09-05 RX ORDER — CLASCOTERONE 1 G/100G
CREAM TOPICAL TWICE DAILY
Qty: 60 | Refills: 8 | Status: ERX | COMMUNITY
Start: 2023-09-05

## 2023-09-05 ASSESSMENT — LOCATION ZONE DERM: LOCATION ZONE: FACE

## 2023-09-05 ASSESSMENT — LOCATION DETAILED DESCRIPTION DERM
LOCATION DETAILED: LEFT CENTRAL MALAR CHEEK
LOCATION DETAILED: LEFT INFERIOR CENTRAL MALAR CHEEK
LOCATION DETAILED: LEFT MEDIAL FOREHEAD
LOCATION DETAILED: RIGHT INFERIOR CENTRAL MALAR CHEEK

## 2023-09-05 ASSESSMENT — LOCATION SIMPLE DESCRIPTION DERM
LOCATION SIMPLE: LEFT FOREHEAD
LOCATION SIMPLE: LEFT CHEEK
LOCATION SIMPLE: RIGHT CHEEK

## 2023-09-05 NOTE — PROCEDURE: PRESCRIPTION MEDICATION MANAGEMENT
Continue Regimen: tretinoin 0.025 % topical cream \\nSig: Apply a pea sized amount to face at night
Detail Level: Zone
Initiate Treatment: Winlevi 1 % topical cream Twice daily\\nQuantity: 60.0 g  Days Supply: 30\\nSig: Apply to entire face twice daily for maintenance.
Discontinue Regimen: spironolactone 50 mg tablet 3 tablets by mouth QD ( per pcp)
Plan: - in morning wash face with La Roche posay , nicho water then Winlevi (let dry) then apply La Roche posay salicylic acid then sunscreen \\n-at night Wash face with La Roche posay cleanser, nicho water then Winlevi (let dry) then apply tretinoin and then moisturizer \\nMay use Nizoral 3 times a week\\n- Winlevi will last 1 month at room temperature and 3 months refrigerated, keep small amount out at a time
Render In Strict Bullet Format?: No

## 2023-09-05 NOTE — PROCEDURE: ADDITIONAL NOTES
Render Risk Assessment In Note?: no
Additional Notes: Recommends peel in the fall\\nRx sent to [facility] pharmacy tear off given
Detail Level: Simple

## 2023-09-05 NOTE — PROCEDURE: COUNSELING
Birth Control Pills Counseling: Birth Control Pill Counseling: I discussed with the patient the potential side effects of OCPs including but not limited to increased risk of stroke, heart attack, thrombophlebitis, deep venous thrombosis, hepatic adenomas, breast changes, GI upset, headaches, changes in libido,  and depression. There is a slight increase risk of breast cancer by 7% overall,( 20-30% higher while taking BCPs, and returning to 0% increased risk within 5 years.) ref: Yumiko Loyd breast cancer site.\\n\\n Advantages of BCPs include regulating periods and menstrual flow, can make periods lighter, and decrease the discomfort of menstrual cramps, decrease risk of osteoporosis, anemia, vaginal dryness, and body hair.  They can decrease the risk of ovarian cancer by 27% and endometrial cancer by 50%. The patient verbalized understanding of the proper use and possible adverse effects of OCPs. All of the patient's questions and concerns were addressed.\\n\\n\\nBcp vs antibiotics:\\Rhoda brand of birth control pills and the Nuvaring improve acne after 4 months as effectively as antibiotics per studies in [facility]. In this age of multi drug resistant bacteria, dermatologists are encouraged to discontinue antibiotics after 3-4 months.  Spironolactone, a hormone receptor blocker is another alternative to both therapies,  There are both medical advantages and side effects of birth control pills that need to be evaluated with your health care provider. Contraindications including women who smoke, with histories of blood clots , migraines, estrogen positive breast cancer or other medical conditions. Age greater than 35 increases risk of all the above risks Birth Control Pills Counseling: Birth Control Pill Counseling: I discussed with the patient the potential side effects of OCPs including but not limited to increased risk of stroke, heart attack, thrombophlebitis, deep venous thrombosis, hepatic adenomas, breast changes, GI upset, headaches, changes in libido,  and depression. There is a slight increase risk of breast cancer by 7% overall,( 20-30% higher while taking BCPs, and returning to 0% increased risk within 5 years.) ref: Yumiko Loyd breast cancer site.\\n\\n Advantages of BCPs include regulating periods and menstrual flow, can make periods lighter, and decrease the discomfort of menstrual cramps, decrease risk of osteoporosis, anemia, vaginal dryness, and body hair.  They can decrease the risk of ovarian cancer by 27% and endometrial cancer by 50%. The patient verbalized understanding of the proper use and possible adverse effects of OCPs. All of the patient's questions and concerns were addressed.\\n\\n\\nBcp vs antibiotics:\\Rhoda brand of birth control pills and the Nuvaring improve acne after 4 months as effectively as antibiotics per studies in [facility]. In this age of multi drug resistant bacteria, dermatologists are encouraged to discontinue antibiotics after 3-4 months.  Spironolactone, a hormone receptor blocker is another alternative to both therapies,  There are both medical advantages and side effects of birth control pills that need to be evaluated with your health care provider. Contraindications including women who smoke, with histories of blood clots , migraines, estrogen positive breast cancer or other medical conditions. Age greater than 35 increases risk of all the above risks

## 2023-09-12 ENCOUNTER — APPOINTMENT (OUTPATIENT)
Dept: URBAN - METROPOLITAN AREA CLINIC 207 | Age: 28
Setting detail: DERMATOLOGY
End: 2023-09-21

## 2023-09-12 DIAGNOSIS — Z41.9 ENCOUNTER FOR PROCEDURE FOR PURPOSES OTHER THAN REMEDYING HEALTH STATE, UNSPECIFIED: ICD-10-CM

## 2023-09-12 PROCEDURE — OTHER FACIAL: OTHER

## 2023-09-12 ASSESSMENT — LOCATION ZONE DERM: LOCATION ZONE: FACE

## 2023-09-12 ASSESSMENT — LOCATION DETAILED DESCRIPTION DERM: LOCATION DETAILED: SUPERIOR MID FOREHEAD

## 2023-09-12 ASSESSMENT — LOCATION SIMPLE DESCRIPTION DERM: LOCATION SIMPLE: SUPERIOR FOREHEAD

## 2023-09-12 NOTE — PROCEDURE: FACIAL
Treatment Serum Override: revision hydrating serum
Extraction Method: cotton-tipped applicator
Treatment Type (Optional): Acne Facial
Exfoliation Type: glycolic exfoliant
Detail Level: Zone
Price (Use Numbers Only, No Special Characters Or $): 100

## 2023-10-03 ENCOUNTER — APPOINTMENT (OUTPATIENT)
Dept: URBAN - METROPOLITAN AREA CLINIC 207 | Age: 28
Setting detail: DERMATOLOGY
End: 2023-10-14

## 2023-10-03 DIAGNOSIS — Z41.9 ENCOUNTER FOR PROCEDURE FOR PURPOSES OTHER THAN REMEDYING HEALTH STATE, UNSPECIFIED: ICD-10-CM

## 2023-10-03 PROCEDURE — OTHER FACIAL: OTHER

## 2023-10-03 ASSESSMENT — LOCATION SIMPLE DESCRIPTION DERM
LOCATION SIMPLE: CHIN
LOCATION SIMPLE: RIGHT FOREHEAD
LOCATION SIMPLE: RIGHT CHEEK
LOCATION SIMPLE: LEFT CHEEK

## 2023-10-03 ASSESSMENT — LOCATION DETAILED DESCRIPTION DERM
LOCATION DETAILED: RIGHT INFERIOR CENTRAL MALAR CHEEK
LOCATION DETAILED: LEFT CHIN
LOCATION DETAILED: RIGHT MEDIAL FOREHEAD
LOCATION DETAILED: LEFT CENTRAL MALAR CHEEK

## 2023-10-03 ASSESSMENT — LOCATION ZONE DERM: LOCATION ZONE: FACE

## 2023-10-03 NOTE — PROCEDURE: FACIAL
Facial Steaming: steamed
Treatment Serum Override: revision hydrating serum
Detail Level: Zone
Price (Use Numbers Only, No Special Characters Or $): 150
Mask Type (Optional): pumpkin
Exfoliation Type: dermaplane
Extraction Method: cotton-tipped applicator
Treatment Type (Optional): Anti Aging Facial

## 2023-11-14 ENCOUNTER — RX ONLY (RX ONLY)
Age: 28
End: 2023-11-14

## 2023-11-14 RX ORDER — CLASCOTERONE 1 G/100G
CREAM TOPICAL TWICE DAILY
Qty: 60 | Refills: 8 | Status: ERX

## 2023-11-21 ENCOUNTER — APPOINTMENT (OUTPATIENT)
Dept: URBAN - METROPOLITAN AREA CLINIC 207 | Age: 28
Setting detail: DERMATOLOGY
End: 2023-11-22

## 2023-11-21 DIAGNOSIS — Z41.9 ENCOUNTER FOR PROCEDURE FOR PURPOSES OTHER THAN REMEDYING HEALTH STATE, UNSPECIFIED: ICD-10-CM

## 2023-11-21 PROCEDURE — OTHER DERMAPLANE: OTHER

## 2023-11-21 PROCEDURE — OTHER FACIAL: OTHER

## 2023-11-21 ASSESSMENT — LOCATION SIMPLE DESCRIPTION DERM
LOCATION SIMPLE: LEFT FOREHEAD
LOCATION SIMPLE: LEFT CHEEK
LOCATION SIMPLE: RIGHT CHEEK
LOCATION SIMPLE: CHIN

## 2023-11-21 ASSESSMENT — LOCATION ZONE DERM: LOCATION ZONE: FACE

## 2023-11-21 ASSESSMENT — LOCATION DETAILED DESCRIPTION DERM
LOCATION DETAILED: LEFT MEDIAL FOREHEAD
LOCATION DETAILED: RIGHT SUPERIOR MEDIAL BUCCAL CHEEK
LOCATION DETAILED: LEFT INFERIOR CENTRAL MALAR CHEEK
LOCATION DETAILED: RIGHT CHIN
LOCATION DETAILED: LEFT SUPERIOR CENTRAL BUCCAL CHEEK
LOCATION DETAILED: LEFT INFERIOR MEDIAL FOREHEAD
LOCATION DETAILED: RIGHT INFERIOR CENTRAL MALAR CHEEK

## 2023-11-21 NOTE — PROCEDURE: FACIAL
Facial Steaming: steamed
Mask Type (Optional): papaya
Treatment Serum Override: revision hydrating serum
Price (Use Numbers Only, No Special Characters Or $): 150
Detail Level: Zone
Treatment Type (Optional): Anti Aging Facial
Extraction Method: cotton-tipped applicator
Exfoliation Type: dermaplane

## 2023-11-21 NOTE — PROCEDURE: DERMAPLANE
Price (Use Numbers Only, No Special Characters Or $): 50
Pre-Procedure Text: The patient was placed in a recumbant position on the procedure table.
Detail Level: Zone
Treatment Areas: face and neck
Blade: 10R blade scalpel
Post-Procedure Instructions: Following the dermaplane procedure, revision soothing facial rinse was applied to the treatment areas. Moisturizer and SPF was applied.
Post-Care Instructions: I reviewed with the patient in detail post-care instructions.

## 2023-12-14 ENCOUNTER — APPOINTMENT (OUTPATIENT)
Dept: URBAN - METROPOLITAN AREA CLINIC 207 | Age: 28
Setting detail: DERMATOLOGY
End: 2023-12-18

## 2023-12-14 DIAGNOSIS — L81.0 POSTINFLAMMATORY HYPERPIGMENTATION: ICD-10-CM

## 2023-12-14 DIAGNOSIS — L70.0 ACNE VULGARIS: ICD-10-CM

## 2023-12-14 PROCEDURE — OTHER PRESCRIPTION MEDICATION MANAGEMENT: OTHER

## 2023-12-14 PROCEDURE — OTHER PATIENT SPECIFIC COUNSELING: OTHER

## 2023-12-14 PROCEDURE — OTHER MIPS QUALITY: OTHER

## 2023-12-14 PROCEDURE — OTHER URINE PREGNANCY TEST: OTHER

## 2023-12-14 PROCEDURE — OTHER COUNSELING: OTHER

## 2023-12-14 PROCEDURE — 99214 OFFICE O/P EST MOD 30 MIN: CPT

## 2023-12-14 PROCEDURE — OTHER PRESCRIPTION: OTHER

## 2023-12-14 PROCEDURE — 81025 URINE PREGNANCY TEST: CPT

## 2023-12-14 RX ORDER — HYDROQUINONE 6% 6 G/100G
EMULSION TOPICAL
Qty: 30 | Refills: 0 | Status: ERX | COMMUNITY
Start: 2023-12-14

## 2023-12-14 ASSESSMENT — LOCATION SIMPLE DESCRIPTION DERM
LOCATION SIMPLE: RIGHT CHEEK
LOCATION SIMPLE: LEFT FOREHEAD
LOCATION SIMPLE: LEFT CHEEK

## 2023-12-14 ASSESSMENT — LOCATION DETAILED DESCRIPTION DERM
LOCATION DETAILED: LEFT MEDIAL FOREHEAD
LOCATION DETAILED: LEFT INFERIOR CENTRAL MALAR CHEEK
LOCATION DETAILED: RIGHT INFERIOR CENTRAL MALAR CHEEK

## 2023-12-14 ASSESSMENT — LOCATION ZONE DERM: LOCATION ZONE: FACE

## 2023-12-14 NOTE — PROCEDURE: COUNSELING
Spironolactone Pregnancy And Lactation Text: This medication can cause feminization of the male fetus and should be avoided during pregnancy. The active metabolite is also found in breast milk.
Winlevi Pregnancy And Lactation Text: This medication is considered safe during pregnancy and breastfeeding.
Dapsone Pregnancy And Lactation Text: This medication is Pregnancy Category C and is not considered safe during pregnancy or breast feeding.
Topical Retinoid counseling:  Patient advised to wash face with a gentle cleanser, Apply a non-comedogenic moisturizer like Cereve PM first all over face , then apply a chocolate chip size amount of the medication only at bedtime after the moisturizer dries a few minutes.  Start three nights a week the first week, then if no irritation, increase to every other night the next night, then if no irritation increase to every single night. When and if you are able to tolerate the medication every night, you can get frisky and put your medication on first and moisturizer on next. Skip a night or two and apply only moisturizer if irritation develops at anytime and go back a step. The goal is to apply the medication every night or at least every other night on top of moisturizer. Patient verbalized understanding of the proper use and possible adverse effects of retinoids.  All of the patient's questions and concerns were addressed.
Minocycline Pregnancy And Lactation Text: This medication is Pregnancy Category D and not consider safe during pregnancy. It is also excreted in breast milk.
Minocycline Counseling: Patient advised regarding possible photosensitivity and discoloration of the teeth, skin, lips, tongue and gums.    The patient was instructed to call the office immediately if the following severe adverse effects occur:  hearing changes, easy bruising/bleeding, severe headache, or double vision.  The patient verbalized understanding of the proper use and possible adverse effects of minocycline. Patient understands this medication is not safe during pregnancy.  All of the patient's questions and concerns were addressed. Probiotics such as yogurt, kimchi, kombucha, kefir and other good bacterial supplementation can be helpful for preservation of a healthy gut.
Topical Clindamycin Pregnancy And Lactation Text: This medication is Pregnancy Category B and is considered safe during pregnancy. It is unknown if it is excreted in breast milk.
Azelaic Acid Counseling: Patient counseled that medicine may cause skin irritation and to avoid applying near the eyes.  In the event of skin irritation, the patient was advised to reduce the amount of the drug applied or use it less frequently.   The patient verbalized understanding of the proper use and possible adverse effects of azelaic acid.  All of the patient's questions and concerns were addressed.
Bactrim Counseling:  I discussed with the patient the risks of sulfa antibiotics including but not limited to GI upset, allergic reaction, drug rash, diarrhea, dizziness, photosensitivity, and yeast infections.  Rarely, more serious reactions can occur including but not limited to aplastic anemia, agranulocytosis, methemoglobinemia, blood dyscrasias, liver or kidney failure, lung infiltrates or desquamative/blistering drug rashes. If patient develops a rash or blisters inside mouth, they should immediately take a Benedryl, call the office, and proceed to the nearest ER. This side effect is VERY rare, but can be serious.  Probiotics such as yogurt, kimchi, kombucha, kefir and other good bacterial supplementation can be helpful for preservation of a healthy gut.
Tetracycline Counseling: Patient counseled regarding possible photosensitivity and increased risk for sunburn.  Patient instructed to avoid sunlight, if possible.  When exposed to sunlight, patients should wear protective clothing, sunglasses, and sunscreen.  The patient was instructed to call the office immediately if the following severe adverse effects occur:  hearing changes, easy bruising/bleeding, severe headache, or vision changes.  The patient verbalized understanding of the proper use and possible adverse effects of tetracycline.  All of the patient's questions and concerns were addressed. Patient understands to avoid pregnancy while on therapy due to potential birth defects. Patient understands to take this medication 1 hour prior to eating or 2 hours after to increase absorption. Milk products can infere with absorption of medication if consumed at the same time.  Probiotics such as yogurt, kimchi, kombucha, kefir and other good bacterial supplementation can be helpful for preservation of a healthy gut.
Erythromycin Pregnancy And Lactation Text: This medication is Pregnancy Category B and is considered safe during pregnancy. It is also excreted in breast milk.
Topical Retinoid Pregnancy And Lactation Text: This medication is Pregnancy Category C. It is unknown if this medication is excreted in breast milk.
High Dose Vitamin A Pregnancy And Lactation Text: High dose vitamin A therapy is contraindicated during pregnancy and breast feeding.
Azelaic Acid Pregnancy And Lactation Text: This medication is considered safe during pregnancy and breast feeding.
Dapsone Counseling: I discussed with the patient the risks of dapsone including but not limited to hemolytic anemia, agranulocytosis, rashes, methemoglobinemia, kidney failure, peripheral neuropathy, headaches, GI upset, and liver toxicity.  Patients who start dapsone require monitoring including baseline LFTs and weekly CBCs for the first month, then every month thereafter.  The patient verbalized understanding of the proper use and possible adverse effects of dapsone.  All of the patient's questions and concerns were addressed. Probiotics such as yogurt, kimchi, kombucha, kefir and other good bacterial supplementation can be helpful for preservation of a healthy gut.
Detail Level: Detailed
Sarecycline Counseling:.     The patient was instructed to call the office immediately if the following severe adverse effects occur:  hearing changes, easy bruising/bleeding, severe headache, or vision changes.  The patient verbalized understanding of the proper use and possible adverse effects of sarecycline. Patient understands to avoid pregnancy while on this medicaion.  All of the patient's questions and concerns were addressed. Probiotics such as yogurt, kimchi, kombucha, kefir and other good bacterial supplementation can be helpful for preservation of a healthy gut.
Azithromycin Pregnancy And Lactation Text: This medication is considered safe during pregnancy and is also secreted in breast milk.
Topical Sulfur Applications Counseling: Topical Sulfur Counseling: Patient counseled that this medication may cause skin irritation or allergic reactions.  In the event of skin irritation, the patient was advised to reduce the amount of the drug applied or use it less frequently.   The patient verbalized understanding of the proper use and possible adverse effects of topical sulfur application.  All of the patient's questions and concerns were addressed.
Erythromycin Counseling:  I discussed with the patient the risks of erythromycin including but not limited to GI upset, allergic reaction, drug rash, diarrhea, increase in liver enzymes, and yeast infections. Probiotics such as yogurt, kimchi, kombucha, kefir and other good bacterial supplementation can be helpful for preservation of a healthy gut.
Tazorac Counseling:  Patient advised that medication is irritating and drying.  Patient may need to apply sparingly and wash off after an hour before eventually leaving it on overnight.  The patient verbalized understanding of the proper use and possible adverse effects of tazorac.  All of the patient's questions and concerns were addressed.
High Dose Vitamin A Counseling: Side effects reviewed, pt to contact office should one occur.
Benzoyl Peroxide Counseling: Patient counseled that medicine may cause skin irritation and bleach clothing.  In the event of skin irritation, the patient was advised to reduce the amount of the drug applied or use it less frequently.   The patient verbalized understanding of the proper use and possible adverse effects of benzoyl peroxide.  All of the patient's questions and concerns were addressed.
Birth Control Pills Pregnancy And Lactation Text: This medication should be avoided if pregnant and for the first 30 days post-partum.
Topical Sulfur Applications Pregnancy And Lactation Text: This medication is Pregnancy Category C and has an unknown safety profile during pregnancy. It is unknown if this topical medication is excreted in breast milk.
Include Pregnancy/Lactation Warning?: Yes
Azithromycin Counseling:  I discussed with the patient the risks of azithromycin including but not limited to GI upset, allergic reaction, drug rash, diarrhea, and yeast infections. Probiotics such as yogurt, kimchi, kombucha, kefir and other good bacterial supplementation can be helpful for preservation of a healthy gut.
Doxycycline Pregnancy And Lactation Text: This medication is Pregnancy Category D and not consider safe during pregnancy. It is also excreted in breast milk but is considered safe for shorter treatment courses.
Tazorac Pregnancy And Lactation Text: This medication is not safe during pregnancy. It is unknown if this medication is excreted in breast milk.
Isotretinoin Pregnancy And Lactation Text: This medication is Pregnancy Category X and is considered extremely dangerous during pregnancy. It is unknown if it is excreted in breast milk.
Aklief counseling:  Patient advised to apply a pea-sized amount only at bedtime and wait 30 minutes after washing their face before applying.  If too drying, patient may add a non-comedogenic moisturizer.  The most commonly reported side effects including irritation, redness, scaling, dryness, stinging, burning, itching, and increased risk of sunburn.  The patient verbalized understanding of the proper use and possible adverse effects of retinoids.  All of the patient's questions and concerns were addressed.
Birth Control Pills Counseling: Birth Control Pill Counseling: I discussed with the patient the potential side effects of OCPs including but not limited to increased risk of stroke, heart attack, thrombophlebitis, deep venous thrombosis, hepatic adenomas, breast changes, GI upset, headaches, changes in libido,  and depression. There is a slight increase risk of breast cancer by 7% overall,( 20-30% higher while taking BCPs, and returning to 0% increased risk within 5 years.) ref: Yumiko Loyd breast cancer site.\\n\\n Advantages of BCPs include regulating periods and menstrual flow, can make periods lighter, and decrease the discomfort of menstrual cramps, decrease risk of osteoporosis, anemia, vaginal dryness, and body hair.  They can decrease the risk of ovarian cancer by 27% and endometrial cancer by 50%. The patient verbalized understanding of the proper use and possible adverse effects of OCPs. All of the patient's questions and concerns were addressed.\\n\\n\\nBcp vs antibiotics:\\Rhoda brand of birth control pills and the Nuvaring improve acne after 4 months as effectively as antibiotics per studies in [facility]. In this age of multi drug resistant bacteria, dermatologists are encouraged to discontinue antibiotics after 3-4 months.  Spironolactone, a hormone receptor blocker is another alternative to both therapies,  There are both medical advantages and side effects of birth control pills that need to be evaluated with your health care provider. Contraindications including women who smoke, with histories of blood clots , migraines, estrogen positive breast cancer or other medical conditions. Age greater than 35 increases risk of all the above risks
Spironolactone Counseling: Patient advised regarding risks of  breast tenderness, abnormal periods, abdominal pain, and hyperkalemia.  Theoretically the medication can feminize a male fetus, so patient was counselled to discontinue use of medication if pregnant. The patient may need blood work to monitor liver and kidney function and potassium levels while on therapy if patient is on another medication that encourages kidneys to hold on to potassium in the body.  The patient verbalized understanding of the proper use and possible adverse effects of spironolactone.  All of the patient's questions and concerns were addressed.
Winlevi Counseling:  I discussed with the patient the risks of topical clascoterone including but not limited to erythema, scaling, itching, and stinging. Patient voiced their understanding.
Doxycycline Counseling:  Patient counseled regarding possible photosensitivity and increased risk for sunburn.  Patient instructed to avoid sunlight, if possible.  When exposed to sunlight, patients should wear protective clothing, sunglasses, and sunscreen.   The patient verbalized understanding of the proper use and possible adverse effects of doxycycline.  All of the patient's questions and concerns were addressed. Patient understands to avoid pregnancy while on therapy due to potential birth defects. Side effects that are not uncommon include but not limited to GI upset,  diarrhea, and yeast infections. Probiotics such as yogurt, kimchi, kombucha, kefir and other good bacterial supplementation can be helpful for preservation of a healthy gut. Milk products and calcium supplementation can interfere with drug absorption.  Please take medication on a full stomach and avoid laying down for an hour after taking medication to avoid GI side effects. All patients questions were addressed.
Benzoyl Peroxide Pregnancy And Lactation Text: This medication is Pregnancy Category C. It is unknown if benzoyl peroxide is excreted in breast milk.
Topical Clindamycin Counseling: Patient counseled that this medication may cause skin irritation or allergic reactions.  In the event of skin irritation, the patient was advised to reduce the amount of the drug applied or use it less frequently.   The patient verbalized understanding of the proper use and possible adverse effects of clindamycin.  All of the patient's questions and concerns were addressed.
Isotretinoin Counseling: Patient should get baseline blood tests, and after at least the second month, maybe more depending on results, not donate blood, not drive at night if vision affected, not share medication, and not undergo elective surgery for 6 months after tx completed. Side effects reviewed, pt to contact office should one occur. Pt should avoid the Tetracycline, Tequila (and other alcohol), Tylenol (acetaminophen), Toddlers(pregnancy), Tattoos, grapefruit and lithium (bipolar medication)
Aklief Pregnancy And Lactation Text: It is unknown if this medication is safe to use during pregnancy.  It is unknown if this medication is excreted in breast milk.  Breastfeeding women should use the topical cream on the smallest area of the skin for the shortest time needed while breastfeeding.  Do not apply to nipple and areola.
Bactrim Pregnancy And Lactation Text: This medication is Pregnancy Category D and is known to cause fetal risk.  It is also excreted in breast milk.
Detail Level: Zone

## 2023-12-14 NOTE — HPI: PIMPLES (ACNE)
What Type Of Note Output Would You Prefer (Optional)?: Standard Output
How Severe Is Your Acne?: moderate
Is This A New Presentation, Or A Follow-Up?: Follow Up Acne
Additional Comments (Use Complete Sentences): She states that there has been no improvement in her acne breakouts, and she continues to have consist inflammatory and noninflammatory acne lesions along her cheeks and jawline. She states that she never took the Spironolactone that was prescribed at her new patient appointment.
Females Only: When Was Your Last Menstrual Period?: 12/07/2023

## 2023-12-14 NOTE — PROCEDURE: PRESCRIPTION MEDICATION MANAGEMENT
Render In Strict Bullet Format?: No
Plan: - Patient has not noticed any improvement in her acne with the Winlevi and Tretinoin. She states that she could not take Spironolactone due to her blood pressure.\\n- Discussed in detail the R/B/AEs of Accutane with the patient and she expressed concerns of an increase in acne breakouts on Accutane. Informed patient that it improves her acne. Additionally, we discussed the need for 2 forms of birth control as it is imperative to avoid pregnancy while taking Accutane. She inquired if it was necessary to be on a birth control/contraceptive and she was informed that it is imperative to either be on some form of birth control or abstain from sex. If she were to become pregnant, the fetus would develop birth defects and not be viable. She did not confirm that she is celibate and/or abstinent as an adult female of childbearing age, just that she would not have sex. She then asked “So I just shouldn’t get pregnant in the next 5 months?” and she\\n- Due to federal regulations on Accutane and requirements for adult women of childbearing age, Dr. Alonso can would like to start patient on Lo Loestrin OCP so that she can proceed with Accutane\\n- ipledge registration completed\\n- Negative UPT obtained and documented in chart
Detail Level: Zone
Initiate Treatment: hydroquinone 6 % topical emulsion - Apply as a spot treatment to dark areas on face once daily. ([facility])

## 2023-12-14 NOTE — PROCEDURE: URINE PREGNANCY TEST
Urine Pregnancy Test Result: negative
Expiration Date (Optional): 02/07/2025
Detail Level: None
Lot # (Optional): [id number]
Performed By: Lynne Alejandre MA

## 2023-12-19 ENCOUNTER — APPOINTMENT (OUTPATIENT)
Dept: URBAN - METROPOLITAN AREA CLINIC 207 | Age: 28
Setting detail: DERMATOLOGY
End: 2023-12-22

## 2023-12-19 DIAGNOSIS — Z41.9 ENCOUNTER FOR PROCEDURE FOR PURPOSES OTHER THAN REMEDYING HEALTH STATE, UNSPECIFIED: ICD-10-CM

## 2023-12-19 PROCEDURE — OTHER DERMAPLANE: OTHER

## 2023-12-19 PROCEDURE — OTHER FACIAL: OTHER

## 2023-12-19 PROCEDURE — OTHER COSMETIC CONSULTATION: MICRONEEDLING: OTHER

## 2023-12-19 PROCEDURE — OTHER COSMETIC CONSULTATION: CHEMICAL PEELS: OTHER

## 2023-12-19 ASSESSMENT — LOCATION ZONE DERM: LOCATION ZONE: FACE

## 2023-12-19 ASSESSMENT — LOCATION DETAILED DESCRIPTION DERM
LOCATION DETAILED: LEFT MEDIAL FOREHEAD
LOCATION DETAILED: LEFT INFERIOR MEDIAL FOREHEAD

## 2023-12-19 ASSESSMENT — LOCATION SIMPLE DESCRIPTION DERM: LOCATION SIMPLE: LEFT FOREHEAD

## 2023-12-19 NOTE — PROCEDURE: FACIAL
Mask Type (Optional): papaya
Price (Use Numbers Only, No Special Characters Or $): 150
Facial Steaming: steamed
Treatment Serum Override: hydrinity restorative serum
Treatment Type (Optional): Anti Aging Facial
Extraction Method: 11 blade and q-tip
Exfoliation Type: dermaplane
Treatment Type Override: oxygen facial
Detail Level: Zone
Led Light (Optional): blue

## 2023-12-19 NOTE — PROCEDURE: DERMAPLANE
Post-Care Instructions: I reviewed with the patient in detail post-care instructions.
Treatment Areas: face and neck
Pre-Procedure Text: The patient was placed in a recumbant position on the procedure table.
Post-Procedure Instructions: Following the dermaplane procedure, revision soothing facial rinse was applied to the treatment areas. Moisturizer and SPF was applied.
Blade: 10R blade scalpel
Detail Level: Zone
Price (Use Numbers Only, No Special Characters Or $): 50

## 2023-12-19 NOTE — PROCEDURE: COSMETIC CONSULTATION: CHEMICAL PEELS
Detail Level: Detailed
Consultation Charge $ (Use Numbers Only, No Special Characters Or $): 0
Send Procedure Quote As Charge: No

## 2024-05-13 ENCOUNTER — APPOINTMENT (OUTPATIENT)
Dept: URBAN - METROPOLITAN AREA CLINIC 207 | Age: 29
Setting detail: DERMATOLOGY
End: 2024-05-14

## 2024-05-13 ENCOUNTER — RX ONLY (RX ONLY)
Age: 29
End: 2024-05-13

## 2024-05-13 DIAGNOSIS — L81.0 POSTINFLAMMATORY HYPERPIGMENTATION: ICD-10-CM

## 2024-05-13 DIAGNOSIS — L70.0 ACNE VULGARIS: ICD-10-CM

## 2024-05-13 PROBLEM — R68.89 OTHER GENERAL SYMPTOMS AND SIGNS: Status: ACTIVE | Noted: 2024-05-13

## 2024-05-13 PROCEDURE — OTHER PRESCRIPTION MEDICATION MANAGEMENT: OTHER

## 2024-05-13 PROCEDURE — OTHER PATIENT SPECIFIC COUNSELING: OTHER

## 2024-05-13 PROCEDURE — OTHER ADDITIONAL NOTES: OTHER

## 2024-05-13 PROCEDURE — OTHER COUNSELING: OTHER

## 2024-05-13 PROCEDURE — OTHER PRESCRIPTION: OTHER

## 2024-05-13 PROCEDURE — OTHER MEDICATION COUNSELING: OTHER

## 2024-05-13 PROCEDURE — OTHER SEPARATE AND IDENTIFIABLE DOCUMENTATION: OTHER

## 2024-05-13 PROCEDURE — OTHER COSMETIC CONSULTATION - MICRO-NEEDLING: OTHER

## 2024-05-13 PROCEDURE — OTHER MIPS QUALITY: OTHER

## 2024-05-13 PROCEDURE — 99214 OFFICE O/P EST MOD 30 MIN: CPT

## 2024-05-13 RX ORDER — HYDROQUINONE 6 %
EMULSION (GRAM) TOPICAL
Qty: 30 | Refills: 0 | Status: ERX

## 2024-05-13 RX ORDER — CLASCOTERONE 1 G/100G
CREAM TOPICAL TWICE DAILY
Qty: 60 | Refills: 8 | Status: ERX

## 2024-05-13 RX ORDER — TRETIONIN 0.5 MG/G
CREAM TOPICAL QHS
Qty: 45 | Refills: 6 | Status: ERX | COMMUNITY
Start: 2024-05-13

## 2024-05-13 RX ORDER — CLINDAMYCIN PHOSPHATE AND BENZOYL PEROXIDE 1 %-5 %
KIT TOPICAL
Qty: 50 | Refills: 4 | Status: ERX | COMMUNITY
Start: 2024-05-13

## 2024-05-13 ASSESSMENT — LOCATION SIMPLE DESCRIPTION DERM
LOCATION SIMPLE: LEFT FOREHEAD
LOCATION SIMPLE: LEFT CHEEK

## 2024-05-13 ASSESSMENT — LOCATION DETAILED DESCRIPTION DERM
LOCATION DETAILED: LEFT MEDIAL FOREHEAD
LOCATION DETAILED: LEFT INFERIOR CENTRAL MALAR CHEEK
LOCATION DETAILED: LEFT INFERIOR MEDIAL MALAR CHEEK

## 2024-05-13 ASSESSMENT — LOCATION ZONE DERM: LOCATION ZONE: FACE

## 2024-05-13 NOTE — PROCEDURE: COUNSELING
Doxycycline Pregnancy And Lactation Text: This medication is Pregnancy Category D and not consider safe during pregnancy. It is also excreted in breast milk but is considered safe for shorter treatment courses.
Tetracycline Pregnancy And Lactation Text: This medication is Pregnancy Category D and not consider safe during pregnancy. It is also excreted in breast milk.
Sarecycline Counseling:.     The patient was instructed to call the office immediately if the following severe adverse effects occur:  hearing changes, easy bruising/bleeding, severe headache, or vision changes.  The patient verbalized understanding of the proper use and possible adverse effects of sarecycline. Patient understands to avoid pregnancy while on this medicaion.  All of the patient's questions and concerns were addressed. Probiotics such as yogurt, kimchi, kombucha, kefir and other good bacterial supplementation can be helpful for preservation of a healthy gut.
Tazorac Counseling:  Patient advised that medication is irritating and drying.  Patient may need to apply sparingly and wash off after an hour before eventually leaving it on overnight.  The patient verbalized understanding of the proper use and possible adverse effects of tazorac.  All of the patient's questions and concerns were addressed.
High Dose Vitamin A Counseling: Side effects reviewed, pt to contact office should one occur.
Azithromycin Counseling:  I discussed with the patient the risks of azithromycin including but not limited to GI upset, allergic reaction, drug rash, diarrhea, and yeast infections. Probiotics such as yogurt, kimchi, kombucha, kefir and other good bacterial supplementation can be helpful for preservation of a healthy gut.
Topical Sulfur Applications Counseling: Topical Sulfur Counseling: Patient counseled that this medication may cause skin irritation or allergic reactions.  In the event of skin irritation, the patient was advised to reduce the amount of the drug applied or use it less frequently.   The patient verbalized understanding of the proper use and possible adverse effects of topical sulfur application.  All of the patient's questions and concerns were addressed.
Birth Control Pills Counseling: Birth Control Pill Counseling: I discussed with the patient the potential side effects of OCPs including but not limited to increased risk of stroke, heart attack, thrombophlebitis, deep venous thrombosis, hepatic adenomas, breast changes, GI upset, headaches, changes in libido,  and depression. There is a slight increase risk of breast cancer by 7% overall,( 20-30% higher while taking BCPs, and returning to 0% increased risk within 5 years.) ref: [facility] Loyd breast cancer site.\\n\\n Advantages of BCPs include regulating periods and menstrual flow, can make periods lighter, and decrease the discomfort of menstrual cramps, decrease risk of osteoporosis, anemia, vaginal dryness, and body hair.  They can decrease the risk of ovarian cancer by 27% and endometrial cancer by 50%. The patient verbalized understanding of the proper use and possible adverse effects of OCPs. All of the patient's questions and concerns were addressed.\\n\\n\\nBcp vs antibiotics:\\Rhoda brand of birth control pills and the Nuvaring improve acne after 4 months as effectively as antibiotics per studies in [facility]. In this age of multi drug resistant bacteria, dermatologists are encouraged to discontinue antibiotics after 3-4 months.  Spironolactone, a hormone receptor blocker is another alternative to both therapies,  There are both medical advantages and side effects of birth control pills that need to be evaluated with your health care provider. Contraindications including women who smoke, with histories of blood clots , migraines, estrogen positive breast cancer or other medical conditions. Age greater than 35 increases risk of all the above risks
Isotretinoin Pregnancy And Lactation Text: This medication is Pregnancy Category X and is considered extremely dangerous during pregnancy. It is unknown if it is excreted in breast milk.
Azelaic Acid Pregnancy And Lactation Text: This medication is considered safe during pregnancy and breast feeding.
Azithromycin Pregnancy And Lactation Text: This medication is considered safe during pregnancy and is also secreted in breast milk.
High Dose Vitamin A Pregnancy And Lactation Text: High dose vitamin A therapy is contraindicated during pregnancy and breast feeding.
Aklief counseling:  Patient advised to apply a pea-sized amount only at bedtime and wait 30 minutes after washing their face before applying.  If too drying, patient may add a non-comedogenic moisturizer.  The most commonly reported side effects including irritation, redness, scaling, dryness, stinging, burning, itching, and increased risk of sunburn.  The patient verbalized understanding of the proper use and possible adverse effects of retinoids.  All of the patient's questions and concerns were addressed.
Erythromycin Counseling:  I discussed with the patient the risks of erythromycin including but not limited to GI upset, allergic reaction, drug rash, diarrhea, increase in liver enzymes, and yeast infections. Probiotics such as yogurt, kimchi, kombucha, kefir and other good bacterial supplementation can be helpful for preservation of a healthy gut.
Use Enhanced Medication Counseling?: Yes
Winlevi Counseling:  I discussed with the patient the risks of topical clascoterone including but not limited to erythema, scaling, itching, and stinging. Patient voiced their understanding.
Dapsone Counseling: I discussed with the patient the risks of dapsone including but not limited to hemolytic anemia, agranulocytosis, rashes, methemoglobinemia, kidney failure, peripheral neuropathy, headaches, GI upset, and liver toxicity.  Patients who start dapsone require monitoring including baseline LFTs and weekly CBCs for the first month, then every month thereafter.  The patient verbalized understanding of the proper use and possible adverse effects of dapsone.  All of the patient's questions and concerns were addressed. Probiotics such as yogurt, kimchi, kombucha, kefir and other good bacterial supplementation can be helpful for preservation of a healthy gut.
Detail Level: Zone
Benzoyl Peroxide Counseling: Patient counseled that medicine may cause skin irritation and bleach clothing.  In the event of skin irritation, the patient was advised to reduce the amount of the drug applied or use it less frequently.   The patient verbalized understanding of the proper use and possible adverse effects of benzoyl peroxide.  All of the patient's questions and concerns were addressed.
Topical Sulfur Applications Pregnancy And Lactation Text: This medication is Pregnancy Category C and has an unknown safety profile during pregnancy. It is unknown if this topical medication is excreted in breast milk.
Birth Control Pills Pregnancy And Lactation Text: This medication should be avoided if pregnant and for the first 30 days post-partum.
Tazorac Pregnancy And Lactation Text: This medication is not safe during pregnancy. It is unknown if this medication is excreted in breast milk.
Winlevi Pregnancy And Lactation Text: This medication is considered safe during pregnancy and breastfeeding.
Aklief Pregnancy And Lactation Text: It is unknown if this medication is safe to use during pregnancy.  It is unknown if this medication is excreted in breast milk.  Breastfeeding women should use the topical cream on the smallest area of the skin for the shortest time needed while breastfeeding.  Do not apply to nipple and areola.
Include Pregnancy/Lactation Warning?: No
Bactrim Counseling:  I discussed with the patient the risks of sulfa antibiotics including but not limited to GI upset, allergic reaction, drug rash, diarrhea, dizziness, photosensitivity, and yeast infections.  Rarely, more serious reactions can occur including but not limited to aplastic anemia, agranulocytosis, methemoglobinemia, blood dyscrasias, liver or kidney failure, lung infiltrates or desquamative/blistering drug rashes. If patient develops a rash or blisters inside mouth, they should immediately take a Benedryl, call the office, and proceed to the nearest ER. This side effect is VERY rare, but can be serious.  Probiotics such as yogurt, kimchi, kombucha, kefir and other good bacterial supplementation can be helpful for preservation of a healthy gut.
Minocycline Counseling: Patient advised regarding possible photosensitivity and discoloration of the teeth, skin, lips, tongue and gums.    The patient was instructed to call the office immediately if the following severe adverse effects occur:  hearing changes, easy bruising/bleeding, severe headache, or double vision.  The patient verbalized understanding of the proper use and possible adverse effects of minocycline. Patient understands this medication is not safe during pregnancy.  All of the patient's questions and concerns were addressed. Probiotics such as yogurt, kimchi, kombucha, kefir and other good bacterial supplementation can be helpful for preservation of a healthy gut.
Topical Retinoid counseling:  Patient advised to wash face with a gentle cleanser, Apply a non-comedogenic moisturizer like Cereve PM first all over face , then apply a chocolate chip size amount of the medication only at bedtime after the moisturizer dries a few minutes.  Start three nights a week the first week, then if no irritation, increase to every other night the next night, then if no irritation increase to every single night. When and if you are able to tolerate the medication every night, you can get frisky and put your medication on first and moisturizer on next. Skip a night or two and apply only moisturizer if irritation develops at anytime and go back a step. The goal is to apply the medication every night or at least every other night on top of moisturizer. Patient verbalized understanding of the proper use and possible adverse effects of retinoids.  All of the patient's questions and concerns were addressed.
Spironolactone Counseling: Patient advised regarding risks of  breast tenderness, abnormal periods, abdominal pain, and hyperkalemia.  Theoretically the medication can feminize a male fetus, so patient was counselled to discontinue use of medication if pregnant. The patient may need blood work to monitor liver and kidney function and potassium levels while on therapy if patient is on another medication that encourages kidneys to hold on to potassium in the body.  The patient verbalized understanding of the proper use and possible adverse effects of spironolactone.  All of the patient's questions and concerns were addressed.
Topical Clindamycin Counseling: Patient counseled that this medication may cause skin irritation or allergic reactions.  In the event of skin irritation, the patient was advised to reduce the amount of the drug applied or use it less frequently.   The patient verbalized understanding of the proper use and possible adverse effects of clindamycin.  All of the patient's questions and concerns were addressed.
Benzoyl Peroxide Pregnancy And Lactation Text: This medication is Pregnancy Category C. It is unknown if benzoyl peroxide is excreted in breast milk.
Erythromycin Pregnancy And Lactation Text: This medication is Pregnancy Category B and is considered safe during pregnancy. It is also excreted in breast milk.
Topical Retinoid Pregnancy And Lactation Text: This medication is Pregnancy Category C. It is unknown if this medication is excreted in breast milk.
Azelaic Acid Counseling: Patient counseled that medicine may cause skin irritation and to avoid applying near the eyes.  In the event of skin irritation, the patient was advised to reduce the amount of the drug applied or use it less frequently.   The patient verbalized understanding of the proper use and possible adverse effects of azelaic acid.  All of the patient's questions and concerns were addressed.
Doxycycline Counseling:  Patient counseled regarding possible photosensitivity and increased risk for sunburn.  Patient instructed to avoid sunlight, if possible.  When exposed to sunlight, patients should wear protective clothing, sunglasses, and sunscreen.   The patient verbalized understanding of the proper use and possible adverse effects of doxycycline.  All of the patient's questions and concerns were addressed. Patient understands to avoid pregnancy while on therapy due to potential birth defects. Side effects that are not uncommon include but not limited to GI upset,  diarrhea, and yeast infections. Probiotics such as yogurt, kimchi, kombucha, kefir and other good bacterial supplementation can be helpful for preservation of a healthy gut. Milk products and calcium supplementation can interfere with drug absorption.  Please take medication on a full stomach and avoid laying down for an hour after taking medication to avoid GI side effects. All patients questions were addressed.
Tetracycline Counseling: Patient counseled regarding possible photosensitivity and increased risk for sunburn.  Patient instructed to avoid sunlight, if possible.  When exposed to sunlight, patients should wear protective clothing, sunglasses, and sunscreen.  The patient was instructed to call the office immediately if the following severe adverse effects occur:  hearing changes, easy bruising/bleeding, severe headache, or vision changes.  The patient verbalized understanding of the proper use and possible adverse effects of tetracycline.  All of the patient's questions and concerns were addressed. Patient understands to avoid pregnancy while on therapy due to potential birth defects. Patient understands to take this medication 1 hour prior to eating or 2 hours after to increase absorption. Milk products can infere with absorption of medication if consumed at the same time.  Probiotics such as yogurt, kimchi, kombucha, kefir and other good bacterial supplementation can be helpful for preservation of a healthy gut.
Isotretinoin Counseling: Patient should get baseline blood tests, and after at least the second month, maybe more depending on results, not donate blood, not drive at night if vision affected, not share medication, and not undergo elective surgery for 6 months after tx completed. Side effects reviewed, pt to contact office should one occur. Pt should avoid the Tetracycline, Tequila (and other alcohol), Tylenol (acetaminophen), Toddlers(pregnancy), Tattoos, grapefruit and lithium (bipolar medication)
Dapsone Pregnancy And Lactation Text: This medication is Pregnancy Category C and is not considered safe during pregnancy or breast feeding.
Spironolactone Pregnancy And Lactation Text: This medication can cause feminization of the male fetus and should be avoided during pregnancy. The active metabolite is also found in breast milk.
Topical Clindamycin Pregnancy And Lactation Text: This medication is Pregnancy Category B and is considered safe during pregnancy. It is unknown if it is excreted in breast milk.
Bactrim Pregnancy And Lactation Text: This medication is Pregnancy Category D and is known to cause fetal risk.  It is also excreted in breast milk.

## 2024-05-13 NOTE — PROCEDURE: PRESCRIPTION MEDICATION MANAGEMENT
Continue Regimen: Winlevi 1 % topical cream TP: Apply to entire face twice daily for maintenance. (Refills sent to [facility])
Render In Strict Bullet Format?: No
Initiate Treatment: clindamycin 1 %-benzoyl peroxide 5 % topical gel: Apply to face every morning\\n\\ntretinoin 0.05 % topical cream QHS: Apply a pea sized amount to face nightly as tolerable (avoid eye regions, nasal creases, corners of mouth).
Plan: Morning: wash face, Clindamycin-benzoyl peroxide gel, winlevi, sunscreen + hydroquinone\\nNight: wash face, Tretinoin, winlevi, spot treat w. hydroquinone
Detail Level: Zone
Continue Regimen: hydroquinone 6 % topical emulsion - Apply as a spot treatment to dark areas on face once daily for two months, then break for two months. Repeat as needed. (Refills sent to [facility])

## 2024-05-13 NOTE — PROCEDURE: ADDITIONAL NOTES
Additional Notes: Patient to discontinue LaRoche Posay benzoyl peroxide\\n\\nAdvised against skin peel at this time
Render Risk Assessment In Note?: no
Detail Level: Simple
Additional Notes: Recommended full body SCE with Dr. Alonso (SEPARATE APPT FROM [facility] F/U)

## 2024-05-13 NOTE — PROCEDURE: MEDICATION COUNSELING
Qbrexza Counseling:  I discussed with the patient the risks of Qbrexza including but not limited to headache, mydriasis, blurred vision, dry eyes, nasal dryness, dry mouth, dry throat, dry skin, urinary hesitation, and constipation.  Local skin reactions including erythema, burning, stinging, and itching can also occur.
Propranolol Counseling:  I discussed with the patient the risks of propranolol including but not limited to low heart rate, low blood pressure, low blood sugar, restlessness and increased cold sensitivity. They should call the office if they experience any of these side effects.
Minoxidil Pregnancy And Lactation Text: This medication has not been assigned a Pregnancy Risk Category but animal studies failed to show danger with the topical medication. It is unknown if the medication is excreted in breast milk.
Cellcept Counseling:  I discussed with the patient the risks of mycophenolate mofetil including but not limited to infection/immunosuppression, GI upset, hypokalemia, hypercholesterolemia, bone marrow suppression, lymphoproliferative disorders, malignancy, GI ulceration/bleed/perforation, colitis, interstitial lung disease, kidney failure, progressive multifocal leukoencephalopathy, and birth defects.  The patient understands that monitoring is required including a baseline creatinine and regular CBC testing. In addition, patient must alert us immediately if symptoms of infection or other concerning signs are noted.
Gabapentin Pregnancy And Lactation Text: This medication is Pregnancy Category C and isn't considered safe during pregnancy. It is excreted in breast milk.
Valtrex Pregnancy And Lactation Text: this medication is Pregnancy Category B and is considered safe during pregnancy. This medication is not directly found in breast milk but it's metabolite acyclovir is present.
Sotyktu Counseling:  I discussed the most common side effects of Sotyktu including: common cold, sore throat, sinus infections, cold sores, canker sores, folliculitis, and acne.? I also discussed more serious side effects of Sotyktu including but not limited to: serious allergic reactions; increased risk for infections such as TB; cancers such as lymphomas; rhabdomyolysis and elevated CPK; and elevated triglycerides and liver enzymes.?
Finasteride Male Counseling: Finasteride Counseling:  I discussed with the patient the risks of use of finasteride including but not limited to decreased libido, decreased ejaculate volume, gynecomastia, and depression. Women should not handle medication.  All of the patient's questions and concerns were addressed.
Infliximab Counseling:  I discussed with the patient the risks of infliximab including but not limited to myelosuppression, immunosuppression, autoimmune hepatitis, demyelinating diseases, lymphoma, and serious infections.  The patient understands that monitoring is required including a PPD at baseline and must alert us or the primary physician if symptoms of infection or other concerning signs are noted.
Nsaids Counseling: NSAID Counseling: I discussed with the patient that NSAIDs should be taken with food. Prolonged use of NSAIDs can result in the development of stomach ulcers.  Patient advised to stop taking NSAIDs if abdominal pain occurs.  The patient verbalized understanding of the proper use and possible adverse effects of NSAIDs.  All of the patient's questions and concerns were addressed.
Azithromycin Pregnancy And Lactation Text: This medication is considered safe during pregnancy and is also secreted in breast milk.
Carac Counseling:  I discussed with the patient the risks of Carac including but not limited to erythema, scaling, itching, weeping, crusting, and pain.
Enbrel Pregnancy And Lactation Text: This medication is Pregnancy Category B and is considered safe during pregnancy. It is unknown if this medication is excreted in breast milk.
Topical Retinoid Pregnancy And Lactation Text: This medication is Pregnancy Category C. It is unknown if this medication is excreted in breast milk.
VTAMA Counseling: I discussed with the patient that VTAMA is not for use in the eyes, mouth or mouth. They should call the office if they develop any signs of allergic reactions to VTAMA. The patient verbalized understanding of the proper use and possible adverse effects of VTAMA.  All of the patient's questions and concerns were addressed.
Erivedge Pregnancy And Lactation Text: This medication is Pregnancy Category X and is absolutely contraindicated during pregnancy. It is unknown if it is excreted in breast milk.
Sotyktu Pregnancy And Lactation Text: There is insufficient data to evaluate whether or not Sotyktu is safe to use during pregnancy.? ?It is not known if Sotyktu passes into breast milk and whether or not it is safe to use when breastfeeding.??
Carac Pregnancy And Lactation Text: This medication is Pregnancy Category X and contraindicated in pregnancy and in women who may become pregnant. It is unknown if this medication is excreted in breast milk.
Humira Counseling:  I discussed with the patient the risks of adalimumab including but not limited to myelosuppression, immunosuppression, autoimmune hepatitis, demyelinating diseases, lymphoma, and serious infections.  The patient understands that monitoring is required including a PPD at baseline and must alert us or the primary physician if symptoms of infection or other concerning signs are noted.
Tazorac Counseling:  Patient advised that medication is irritating and drying.  Patient may need to apply sparingly and wash off after an hour before eventually leaving it on overnight.  The patient verbalized understanding of the proper use and possible adverse effects of tazorac.  All of the patient's questions and concerns were addressed.
Nsaids Pregnancy And Lactation Text: These medications are considered safe up to 30 weeks gestation. It is excreted in breast milk.
Itraconazole Pregnancy And Lactation Text: This medication is Pregnancy Category C and it isn't know if it is safe during pregnancy. It is also excreted in breast milk.
Eucrisa Counseling: Patient may experience a mild burning sensation during topical application. Eucrisa is not approved in children less than 3 months of age.
Stelara Counseling:  I discussed with the patient the risks of ustekinumab including but not limited to immunosuppression, malignancy, posterior leukoencephalopathy syndrome, and serious infections.  The patient understands that monitoring is required including a PPD at baseline and must alert us or the primary physician if symptoms of infection or other concerning signs are noted.
Sarecycline Counseling: Patient advised regarding possible photosensitivity and discoloration of the teeth, skin, lips, tongue and gums.  Patient instructed to avoid sunlight, if possible.  When exposed to sunlight, patients should wear protective clothing, sunglasses, and sunscreen.  The patient was instructed to call the office immediately if the following severe adverse effects occur:  hearing changes, easy bruising/bleeding, severe headache, or vision changes.  The patient verbalized understanding of the proper use and possible adverse effects of sarecycline.  All of the patient's questions and concerns were addressed.
Bimzelx Counseling:  I discussed with the patient the risks of Bimzelx including but not limited to depression, immunosuppression, allergic reactions and infections.  The patient understands that monitoring is required including a PPD at baseline and must alert us or the primary physician if symptoms of infection or other concerning signs are noted.
Erythromycin Pregnancy And Lactation Text: This medication is Pregnancy Category B and is considered safe during pregnancy. It is also excreted in breast milk.
Arava Counseling:  Patient counseled regarding adverse effects of Arava including but not limited to nausea, vomiting, abnormalities in liver function tests. Patients may develop mouth sores, rash, diarrhea, and abnormalities in blood counts. The patient understands that monitoring is required including LFTs and blood counts.  There is a rare possibility of scarring of the liver and lung problems that can occur when taking methotrexate. Persistent nausea, loss of appetite, pale stools, dark urine, cough, and shortness of breath should be reported immediately. Patient advised to discontinue Arava treatment and consult with a physician prior to attempting conception. The patient will have to undergo a treatment to eliminate Arava from the body prior to conception.
Hydroxyzine Counseling: Patient advised that the medication is sedating and not to drive a car after taking this medication.  Patient informed of potential adverse effects including but not limited to dry mouth, urinary retention, and blurry vision.  The patient verbalized understanding of the proper use and possible adverse effects of hydroxyzine.  All of the patient's questions and concerns were addressed.
Topical Steroids Applications Pregnancy And Lactation Text: Most topical steroids are considered safe to use during pregnancy and lactation.  Any topical steroid applied to the breast or nipple should be washed off before breastfeeding.
Sarecycline Pregnancy And Lactation Text: This medication is Pregnancy Category D and not consider safe during pregnancy. It is also excreted in breast milk.
Bimzelx Pregnancy And Lactation Text: This medication crosses the placenta and the safety is uncertain during pregnancy. It is unknown if this medication is present in breast milk.
Mirvaso Counseling: Mirvaso is a topical medication which can decrease superficial blood flow where applied. Side effects are uncommon and include stinging, redness and allergic reactions.
Ketoconazole Counseling:   Patient counseled regarding improving absorption with orange juice.  Adverse effects include but are not limited to breast enlargement, headache, diarrhea, nausea, upset stomach, liver function test abnormalities, taste disturbance, and stomach pain.  There is a rare possibility of liver failure that can occur when taking ketoconazole. The patient understands that monitoring of LFTs may be required, especially at baseline. The patient verbalized understanding of the proper use and possible adverse effects of ketoconazole.  All of the patient's questions and concerns were addressed.
Propranolol Pregnancy And Lactation Text: This medication is Pregnancy Category C and it isn't known if it is safe during pregnancy. It is excreted in breast milk.
Use Enhanced Medication Counseling?: No
Cibinqo Counseling: I discussed with the patient the risks of Cibinqo therapy including but not limited to common cold, nausea, headache, cold sores, increased blood CPK levels, dizziness, UTIs, fatigue, acne, and vomitting. Live vaccines should be avoided.  This medication has been linked to serious infections; higher rate of mortality; malignancy and lymphoproliferative disorders; major adverse cardiovascular events; thrombosis; thrombocytopenia and lymphopenia; lipid elevations; and retinal detachment.
Glycopyrrolate Counseling:  I discussed with the patient the risks of glycopyrrolate including but not limited to skin rash, drowsiness, dry mouth, difficulty urinating, and blurred vision.
Bactrim Counseling:  I discussed with the patient the risks of sulfa antibiotics including but not limited to GI upset, allergic reaction, drug rash, diarrhea, dizziness, photosensitivity, and yeast infections.  Rarely, more serious reactions can occur including but not limited to aplastic anemia, agranulocytosis, methemoglobinemia, blood dyscrasias, liver or kidney failure, lung infiltrates or desquamative/blistering drug rashes.
Finasteride Female Counseling: Finasteride Counseling:  I discussed with the patient the risks of use of finasteride including but not limited to decreased libido and sexual dysfunction. Explained the teratogenic nature of the medication and stressed the importance of not getting pregnant during treatment. All of the patient's questions and concerns were addressed.
Cellcept Pregnancy And Lactation Text: This medication is Pregnancy Category D and isn't considered safe during pregnancy. It is unknown if this medication is excreted in breast milk.
Qbrexza Pregnancy And Lactation Text: There is no available data on Qbrexza use in pregnant women.  There is no available data on Qbrexza use in lactation.
Rituxan Counseling:  I discussed with the patient the risks of Rituxan infusions. Side effects can include infusion reactions, severe drug rashes including mucocutaneous reactions, reactivation of latent hepatitis and other infections and rarely progressive multifocal leukoencephalopathy.  All of the patient's questions and concerns were addressed.
Cyclophosphamide Counseling:  I discussed with the patient the risks of cyclophosphamide including but not limited to hair loss, hormonal abnormalities, decreased fertility, abdominal pain, diarrhea, nausea and vomiting, bone marrow suppression and infection. The patient understands that monitoring is required while taking this medication.
Tazorac Pregnancy And Lactation Text: This medication is not safe during pregnancy. It is unknown if this medication is excreted in breast milk.
Calcipotriene Counseling:  I discussed with the patient the risks of calcipotriene including but not limited to erythema, scaling, itching, and irritation.
Rhofade Counseling: Rhofade is a topical medication which can decrease superficial blood flow where applied. Side effects are uncommon and include stinging, redness and allergic reactions.
Libtayo Counseling- I discussed with the patient the risks of Libtayo including but not limited to nausea, vomiting, diarrhea, and bone or muscle pain.  The patient verbalized understanding of the proper use and possible adverse effects of Libtayo.  All of the patient's questions and concerns were addressed.
Bactrim Pregnancy And Lactation Text: This medication is Pregnancy Category D and is known to cause fetal risk.  It is also excreted in breast milk.
Xeljanz Counseling: I discussed with the patient the risks of Xeljanz therapy including increased risk of infection, liver issues, headache, diarrhea, or cold symptoms. Live vaccines should be avoided. They were instructed to call if they have any problems.
Finasteride Pregnancy And Lactation Text: This medication is absolutely contraindicated during pregnancy. It is unknown if it is excreted in breast milk.
Olanzapine Counseling- I discussed with the patient the common side effects of olanzapine including but are not limited to: lack of energy, dry mouth, increased appetite, sleepiness, tremor, constipation, dizziness, changes in behavior, or restlessness.  Explained that teenagers are more likely to experience headaches, abdominal pain, pain in the arms or legs, tiredness, and sleepiness.  Serious side effects include but are not limited: increased risk of death in elderly patients who are confused, have memory loss, or dementia-related psychosis; hyperglycemia; increased cholesterol and triglycerides; and weight gain.
Vtama Pregnancy And Lactation Text: It is unknown if this medication can cause problems during pregnancy and breastfeeding.
Hydroxyzine Pregnancy And Lactation Text: This medication is not safe during pregnancy and should not be taken. It is also excreted in breast milk and breast feeding isn't recommended.
Metronidazole Counseling:  I discussed with the patient the risks of metronidazole including but not limited to seizures, nausea/vomiting, a metallic taste in the mouth, nausea/vomiting and severe allergy.
Clofazimine Counseling:  I discussed with the patient the risks of clofazimine including but not limited to skin and eye pigmentation, liver damage, nausea/vomiting, gastrointestinal bleeding and allergy.
Ketoconazole Pregnancy And Lactation Text: This medication is Pregnancy Category C and it isn't know if it is safe during pregnancy. It is also excreted in breast milk and breast feeding isn't recommended.
Hydroquinone Counseling:  Patient advised that medication may result in skin irritation, lightening (hypopigmentation), dryness, and burning.  In the event of skin irritation, the patient was advised to reduce the amount of the drug applied or use it less frequently.  Rarely, spots that are treated with hydroquinone can become darker (pseudoochronosis).  Should this occur, patient instructed to stop medication and call the office. The patient verbalized understanding of the proper use and possible adverse effects of hydroquinone.  All of the patient's questions and concerns were addressed.
SSKI Counseling:  I discussed with the patient the risks of SSKI including but not limited to thyroid abnormalities, metallic taste, GI upset, fever, headache, acne, arthralgias, paraesthesias, lymphadenopathy, easy bleeding, arrhythmias, and allergic reaction.
Cibinqo Pregnancy And Lactation Text: It is unknown if this medication will adversely affect pregnancy or breast feeding.  You should not take this medication if you are currently pregnant or planning a pregnancy or while breastfeeding.
Mirvaso Pregnancy And Lactation Text: This medication has not been assigned a Pregnancy Risk Category. It is unknown if the medication is excreted in breast milk.
Calcipotriene Pregnancy And Lactation Text: The use of this medication during pregnancy or lactation is not recommended as there is insufficient data.
Glycopyrrolate Pregnancy And Lactation Text: This medication is Pregnancy Category B and is considered safe during pregnancy. It is unknown if it is excreted breast milk.
Tetracycline Counseling: Patient counseled regarding possible photosensitivity and increased risk for sunburn.  Patient instructed to avoid sunlight, if possible.  When exposed to sunlight, patients should wear protective clothing, sunglasses, and sunscreen.  The patient was instructed to call the office immediately if the following severe adverse effects occur:  hearing changes, easy bruising/bleeding, severe headache, or vision changes.  The patient verbalized understanding of the proper use and possible adverse effects of tetracycline.  All of the patient's questions and concerns were addressed. Patient understands to avoid pregnancy while on therapy due to potential birth defects.
Cimzia Counseling:  I discussed with the patient the risks of Cimzia including but not limited to immunosuppression, allergic reactions and infections.  The patient understands that monitoring is required including a PPD at baseline and must alert us or the primary physician if symptoms of infection or other concerning signs are noted.
Litfulo Counseling: I discussed with the patient the risks of Litfulo therapy including but not limited to upper respiratory tract infections, shingles, cold sores, and nausea. Live vaccines should be avoided.  This medication has been linked to serious infections; higher rate of mortality; malignancy and lymphoproliferative disorders; major adverse cardiovascular events; thrombosis; gastrointestinal perforations; neutropenia; lymphopenia; anemia; liver enzyme elevations; and lipid elevations.
Cimzia Pregnancy And Lactation Text: This medication crosses the placenta but can be considered safe in certain situations. Cimzia may be excreted in breast milk.
Opzelura Counseling:  I discussed with the patient the risks of Opzelura including but not limited to nasopharngitis, bronchitis, ear infection, eosinophila, hives, diarrhea, folliculitis, tonsillitis, and rhinorrhea.  Taken orally, this medication has been linked to serious infections; higher rate of mortality; malignancy and lymphoproliferative disorders; major adverse cardiovascular events; thrombosis; thrombocytopenia, anemia, and neutropenia; and lipid elevations.
Aklief counseling:  Patient advised to apply a pea-sized amount only at bedtime and wait 30 minutes after washing their face before applying.  If too drying, patient may add a non-comedogenic moisturizer.  The most commonly reported side effects including irritation, redness, scaling, dryness, stinging, burning, itching, and increased risk of sunburn.  The patient verbalized understanding of the proper use and possible adverse effects of retinoids.  All of the patient's questions and concerns were addressed.
Cephalexin Counseling: I counseled the patient regarding use of cephalexin as an antibiotic for prophylactic and/or therapeutic purposes. Cephalexin (commonly prescribed under brand name Keflex) is a cephalosporin antibiotic which is active against numerous classes of bacteria, including most skin bacteria. Side effects may include nausea, diarrhea, gastrointestinal upset, rash, hives, yeast infections, and in rare cases, hepatitis, kidney disease, seizures, fever, confusion, neurologic symptoms, and others. Patients with severe allergies to penicillin medications are cautioned that there is about a 10% incidence of cross-reactivity with cephalosporins. When possible, patients with penicillin allergies should use alternatives to cephalosporins for antibiotic therapy.
Albendazole Counseling:  I discussed with the patient the risks of albendazole including but not limited to cytopenia, kidney damage, nausea/vomiting and severe allergy.  The patient understands that this medication is being used in an off-label manner.
Hydroxychloroquine Counseling:  I discussed with the patient that a baseline ophthalmologic exam is needed at the start of therapy and every year thereafter while on therapy. A CBC may also be warranted for monitoring.  The side effects of this medication were discussed with the patient, including but not limited to agranulocytosis, aplastic anemia, seizures, rashes, retinopathy, and liver toxicity. Patient instructed to call the office should any adverse effect occur.  The patient verbalized understanding of the proper use and possible adverse effects of Plaquenil.  All the patient's questions and concerns were addressed.
Xelkavehz Pregnancy And Lactation Text: This medication is Pregnancy Category D and is not considered safe during pregnancy.  The risk during breast feeding is also uncertain.
Cantharidin Counseling:  I discussed with the patient the risks of Cantharidin including but not limited to pain, redness, burning, itching, and blistering.
Birth Control Pills Counseling: Birth Control Pill Counseling: I discussed with the patient the potential side effects of OCPs including but not limited to increased risk of stroke, heart attack, thrombophlebitis, deep venous thrombosis, hepatic adenomas, breast changes, GI upset, headaches, and depression.  The patient verbalized understanding of the proper use and possible adverse effects of OCPs. All of the patient's questions and concerns were addressed.
Rituxan Pregnancy And Lactation Text: This medication is Pregnancy Category C and it isn't know if it is safe during pregnancy. It is unknown if this medication is excreted in breast milk but similar antibodies are known to be excreted.
Cyclophosphamide Pregnancy And Lactation Text: This medication is Pregnancy Category D and it isn't considered safe during pregnancy. This medication is excreted in breast milk.
Metronidazole Pregnancy And Lactation Text: This medication is Pregnancy Category B and considered safe during pregnancy.  It is also excreted in breast milk.
Acitretin Pregnancy And Lactation Text: This medication is Pregnancy Category X and should not be given to women who are pregnant or may become pregnant in the future. This medication is excreted in breast milk.
Olanzapine Pregnancy And Lactation Text: This medication is pregnancy category C.   There are no adequate and well controlled trials with olanzapine in pregnant females.  Olanzapine should be used during pregnancy only if the potential benefit justifies the potential risk to the fetus.   In a study in lactating healthy women, olanzapine was excreted in breast milk.  It is recommended that women taking olanzapine should not breast feed.
Zoryve Counseling:  I discussed with the patient that Zoryve is not for use in the eyes, mouth or vagina. The most commonly reported side effects include diarrhea, headache, insomnia, application site pain, upper respiratory tract infections, and urinary tract infections.  All of the patient's questions and concerns were addressed.
Taltz Counseling: I discussed with the patient the risks of ixekizumab including but not limited to immunosuppression, serious infections, worsening of inflammatory bowel disease and drug reactions.  The patient understands that monitoring is required including a PPD at baseline and must alert us or the primary physician if symptoms of infection or other concerning signs are noted.
Libtayo Pregnancy And Lactation Text: This medication is contraindicated in pregnancy and when breast feeding.
Hyrimoz Counseling:  I discussed with the patient the risks of adalimumab including but not limited to myelosuppression, immunosuppression, autoimmune hepatitis, demyelinating diseases, lymphoma, and serious infections.  The patient understands that monitoring is required including a PPD at baseline and must alert us or the primary physician if symptoms of infection or other concerning signs are noted.
Topical Clindamycin Counseling: Patient counseled that this medication may cause skin irritation or allergic reactions.  In the event of skin irritation, the patient was advised to reduce the amount of the drug applied or use it less frequently.   The patient verbalized understanding of the proper use and possible adverse effects of clindamycin.  All of the patient's questions and concerns were addressed.
Taltz Pregnancy And Lactation Text: The risk during pregnancy and breastfeeding is uncertain with this medication.
Terbinafine Counseling: Patient counseling regarding adverse effects of terbinafine including but not limited to headache, diarrhea, rash, upset stomach, liver function test abnormalities, itching, taste/smell disturbance, nausea, abdominal pain, and flatulence.  There is a rare possibility of liver failure that can occur when taking terbinafine.  The patient understands that a baseline LFT and kidney function test may be required. The patient verbalized understanding of the proper use and possible adverse effects of terbinafine.  All of the patient's questions and concerns were addressed.
Odomzo Counseling- I discussed with the patient the risks of Odomzo including but not limited to nausea, vomiting, diarrhea, constipation, weight loss, changes in the sense of taste, decreased appetite, muscle spasms, and hair loss.  The patient verbalized understanding of the proper use and possible adverse effects of Odomzo.  All of the patient's questions and concerns were addressed.
5-Fu Counseling: 5-Fluorouracil Counseling:  I discussed with the patient the risks of 5-fluorouracil including but not limited to erythema, scaling, itching, weeping, crusting, and pain.
Topical Clindamycin Pregnancy And Lactation Text: This medication is Pregnancy Category B and is considered safe during pregnancy. It is unknown if it is excreted in breast milk.
Minocycline Counseling: Patient advised regarding possible photosensitivity and discoloration of the teeth, skin, lips, tongue and gums.  Patient instructed to avoid sunlight, if possible.  When exposed to sunlight, patients should wear protective clothing, sunglasses, and sunscreen.  The patient was instructed to call the office immediately if the following severe adverse effects occur:  hearing changes, easy bruising/bleeding, severe headache, or vision changes.  The patient verbalized understanding of the proper use and possible adverse effects of minocycline.  All of the patient's questions and concerns were addressed.
Sski Pregnancy And Lactation Text: This medication is Pregnancy Category D and isn't considered safe during pregnancy. It is excreted in breast milk.
Cantharidin Pregnancy And Lactation Text: This medication has not been proven safe during pregnancy. It is unknown if this medication is excreted in breast milk.
Cosentyx Counseling:  I discussed with the patient the risks of Cosentyx including but not limited to worsening of Crohn's disease, immunosuppression, allergic reactions and infections.  The patient understands that monitoring is required including a PPD at baseline and must alert us or the primary physician if symptoms of infection or other concerning signs are noted.
Acitretin Counseling:  I discussed with the patient the risks of acitretin including but not limited to hair loss, dry lips/skin/eyes, liver damage, hyperlipidemia, depression/suicidal ideation, photosensitivity.  Serious rare side effects can include but are not limited to pancreatitis, pseudotumor cerebri, bony changes, clot formation/stroke/heart attack.  Patient understands that alcohol is contraindicated since it can result in liver toxicity and significantly prolong the elimination of the drug by many years.
Solaraze Counseling:  I discussed with the patient the risks of Solaraze including but not limited to erythema, scaling, itching, weeping, crusting, and pain.
Aklief Pregnancy And Lactation Text: It is unknown if this medication is safe to use during pregnancy.  It is unknown if this medication is excreted in breast milk.  Breastfeeding women should use the topical cream on the smallest area of the skin for the shortest time needed while breastfeeding.  Do not apply to nipple and areola.
Albendazole Pregnancy And Lactation Text: This medication is Pregnancy Category C and it isn't known if it is safe during pregnancy. It is also excreted in breast milk.
Opzelura Pregnancy And Lactation Text: There is insufficient data to evaluate drug-associated risk for major birth defects, miscarriage, or other adverse maternal or fetal outcomes.  There is a pregnancy registry that monitors pregnancy outcomes in pregnant persons exposed to the medication during pregnancy.  It is unknown if this medication is excreted in breast milk.  Do not breastfeed during treatment and for about 4 weeks after the last dose.
Cyclosporine Counseling:  I discussed with the patient the risks of cyclosporine including but not limited to hypertension, gingival hyperplasia,myelosuppression, immunosuppression, liver damage, kidney damage, neurotoxicity, lymphoma, and serious infections. The patient understands that monitoring is required including baseline blood pressure, CBC, CMP, lipid panel and uric acid, and then 1-2 times monthly CMP and blood pressure.
Litfulo Pregnancy And Lactation Text: Based on animal studies, Lifulo may cause embryo-fetal harm when administered to pregnant women.  The medication should not be used in pregnancy.  Breastfeeding is not recommended during treatment.
Hydroxychloroquine Pregnancy And Lactation Text: This medication has been shown to cause fetal harm but it isn't assigned a Pregnancy Risk Category. There are small amounts excreted in breast milk.
Topical Sulfur Applications Counseling: Topical Sulfur Counseling: Patient counseled that this medication may cause skin irritation or allergic reactions.  In the event of skin irritation, the patient was advised to reduce the amount of the drug applied or use it less frequently.   The patient verbalized understanding of the proper use and possible adverse effects of topical sulfur application.  All of the patient's questions and concerns were addressed.
Siliq Counseling:  I discussed with the patient the risks of Siliq including but not limited to new or worsening depression, suicidal thoughts and behavior, immunosuppression, malignancy, posterior leukoencephalopathy syndrome, and serious infections.  The patient understands that monitoring is required including a PPD at baseline and must alert us or the primary physician if symptoms of infection or other concerning signs are noted. There is also a special program designed to monitor depression which is required with Siliq.
Bexarotene Counseling:  I discussed with the patient the risks of bexarotene including but not limited to hair loss, dry lips/skin/eyes, liver abnormalities, hyperlipidemia, pancreatitis, depression/suicidal ideation, photosensitivity, drug rash/allergic reactions, hypothyroidism, anemia, leukopenia, infection, cataracts, and teratogenicity.  Patient understands that they will need regular blood tests to check lipid profile, liver function tests, white blood cell count, thyroid function tests and pregnancy test if applicable.
Birth Control Pills Pregnancy And Lactation Text: This medication should be avoided if pregnant and for the first 30 days post-partum.
Cephalexin Pregnancy And Lactation Text: This medication is Pregnancy Category B and considered safe during pregnancy.  It is also excreted in breast milk but can be used safely for shorter doses.
Oral Minoxidil Counseling- I discussed with the patient the risks of oral minoxidil including but not limited to shortness of breath, swelling of the feet or ankles, dizziness, lightheadedness, unwanted hair growth and allergic reaction.  The patient verbalized understanding of the proper use and possible adverse effects of oral minoxidil.  All of the patient's questions and concerns were addressed.
Zyclara Counseling:  I discussed with the patient the risks of imiquimod including but not limited to erythema, scaling, itching, weeping, crusting, and pain.  Patient understands that the inflammatory response to imiquimod is variable from person to person and was educated regarded proper titration schedule.  If flu-like symptoms develop, patient knows to discontinue the medication and contact us.
Fluconazole Counseling:  Patient counseled regarding adverse effects of fluconazole including but not limited to headache, diarrhea, nausea, upset stomach, liver function test abnormalities, taste disturbance, and stomach pain.  There is a rare possibility of liver failure that can occur when taking fluconazole.  The patient understands that monitoring of LFTs and kidney function test may be required, especially at baseline. The patient verbalized understanding of the proper use and possible adverse effects of fluconazole.  All of the patient's questions and concerns were addressed.
Topical Ketoconazole Counseling: Patient counseled that this medication may cause skin irritation or allergic reactions.  In the event of skin irritation, the patient was advised to reduce the amount of the drug applied or use it less frequently.   The patient verbalized understanding of the proper use and possible adverse effects of ketoconazole.  All of the patient's questions and concerns were addressed.
Spironolactone Counseling: Patient advised regarding risks of diarrhea, abdominal pain, hyperkalemia, birth defects (for female patients), liver toxicity and renal toxicity. The patient may need blood work to monitor liver and kidney function and potassium levels while on therapy. The patient verbalized understanding of the proper use and possible adverse effects of spironolactone.  All of the patient's questions and concerns were addressed.
Oral Minoxidil Pregnancy And Lactation Text: This medication should only be used when clearly needed if you are pregnant, attempting to become pregnant or breast feeding.
Imiquimod Counseling:  I discussed with the patient the risks of imiquimod including but not limited to erythema, scaling, itching, weeping, crusting, and pain.  Patient understands that the inflammatory response to imiquimod is variable from person to person and was educated regarded proper titration schedule.  If flu-like symptoms develop, patient knows to discontinue the medication and contact us.
Terbinafine Pregnancy And Lactation Text: This medication is Pregnancy Category B and is considered safe during pregnancy. It is also excreted in breast milk and breast feeding isn't recommended.
Tremfya Counseling: I discussed with the patient the risks of guselkumab including but not limited to immunosuppression, serious infections, and drug reactions.  The patient understands that monitoring is required including a PPD at baseline and must alert us or the primary physician if symptoms of infection or other concerning signs are noted.
Thalidomide Counseling: I discussed with the patient the risks of thalidomide including but not limited to birth defects, anxiety, weakness, chest pain, dizziness, cough and severe allergy.
Colchicine Counseling:  Patient counseled regarding adverse effects including but not limited to stomach upset (nausea, vomiting, stomach pain, or diarrhea).  Patient instructed to limit alcohol consumption while taking this medication.  Colchicine may reduce blood counts especially with prolonged use.  The patient understands that monitoring of kidney function and blood counts may be required, especially at baseline. The patient verbalized understanding of the proper use and possible adverse effects of colchicine.  All of the patient's questions and concerns were addressed.
Picato Counseling:  I discussed with the patient the risks of Picato including but not limited to erythema, scaling, itching, weeping, crusting, and pain.
Bexarotene Pregnancy And Lactation Text: This medication is Pregnancy Category X and should not be given to women who are pregnant or may become pregnant. This medication should not be used if you are breast feeding.
Ivermectin Counseling:  Patient instructed to take medication on an empty stomach with a full glass of water.  Patient informed of potential adverse effects including but not limited to nausea, diarrhea, dizziness, itching, and swelling of the extremities or lymph nodes.  The patient verbalized understanding of the proper use and possible adverse effects of ivermectin.  All of the patient's questions and concerns were addressed.
Olumiant Counseling: I discussed with the patient the risks of Olumiant therapy including but not limited to upper respiratory tract infections, shingles, cold sores, and nausea. Live vaccines should be avoided.  This medication has been linked to serious infections; higher rate of mortality; malignancy and lymphoproliferative disorders; major adverse cardiovascular events; thrombosis; gastrointestinal perforations; neutropenia; lymphopenia; anemia; liver enzyme elevations; and lipid elevations.
Low Dose Naltrexone Counseling- I discussed with the patient the potential risks and side effects of low dose naltrexone including but not limited to: more vivid dreams, headaches, nausea, vomiting, abdominal pain, fatigue, dizziness, and anxiety.
Clindamycin Counseling: I counseled the patient regarding use of clindamycin as an antibiotic for prophylactic and/or therapeutic purposes. Clindamycin is active against numerous classes of bacteria, including skin bacteria. Side effects may include nausea, diarrhea, gastrointestinal upset, rash, hives, yeast infections, and in rare cases, colitis.
Topical Sulfur Applications Pregnancy And Lactation Text: This medication is considered safe during pregnancy and breast feeding secondary to limited systemic absorption.
Azelaic Acid Counseling: Patient counseled that medicine may cause skin irritation and to avoid applying near the eyes.  In the event of skin irritation, the patient was advised to reduce the amount of the drug applied or use it less frequently.   The patient verbalized understanding of the proper use and possible adverse effects of azelaic acid.  All of the patient's questions and concerns were addressed.
Cyclosporine Pregnancy And Lactation Text: This medication is Pregnancy Category C and it isn't know if it is safe during pregnancy. This medication is excreted in breast milk.
Solaraze Pregnancy And Lactation Text: This medication is Pregnancy Category B and is considered safe. There is some data to suggest avoiding during the third trimester. It is unknown if this medication is excreted in breast milk.
Cimetidine Counseling:  I discussed with the patient the risks of Cimetidine including but not limited to gynecomastia, headache, diarrhea, nausea, drowsiness, arrhythmias, pancreatitis, skin rashes, psychosis, bone marrow suppression and kidney toxicity.
Simponi Counseling:  I discussed with the patient the risks of golimumab including but not limited to myelosuppression, immunosuppression, autoimmune hepatitis, demyelinating diseases, lymphoma, and serious infections.  The patient understands that monitoring is required including a PPD at baseline and must alert us or the primary physician if symptoms of infection or other concerning signs are noted.
Methotrexate Counseling:  Patient counseled regarding adverse effects of methotrexate including but not limited to nausea, vomiting, abnormalities in liver function tests. Patients may develop mouth sores, rash, diarrhea, and abnormalities in blood counts. The patient understands that monitoring is required including LFT's and blood counts.  There is a rare possibility of scarring of the liver and lung problems that can occur when taking methotrexate. Persistent nausea, loss of appetite, pale stools, dark urine, cough, and shortness of breath should be reported immediately. Patient advised to discontinue methotrexate treatment at least three months before attempting to become pregnant.  I discussed the need for folate supplements while taking methotrexate.  These supplements can decrease side effects during methotrexate treatment. The patient verbalized understanding of the proper use and possible adverse effects of methotrexate.  All of the patient's questions and concerns were addressed.
Olumiant Pregnancy And Lactation Text: Based on animal studies, Olumiant may cause embryo-fetal harm when administered to pregnant women.  The medication should not be used in pregnancy.  Breastfeeding is not recommended during treatment.
Drysol Counseling:  I discussed with the patient the risks of drysol/aluminum chloride including but not limited to skin rash, itching, irritation, burning.
Azelaic Acid Pregnancy And Lactation Text: This medication is considered safe during pregnancy and breast feeding.
Soolantra Counseling: I discussed with the patients the risks of topial Soolantra. This is a medicine which decreases the number of mites and inflammation in the skin. You experience burning, stinging, eye irritation or allergic reactions.  Please call our office if you develop any problems from using this medication.
Isotretinoin Counseling: Patient should get monthly blood tests, not donate blood, not drive at night if vision affected, not share medication, and not undergo elective surgery for 6 months after tx completed. Side effects reviewed, pt to contact office should one occur.
Clindamycin Pregnancy And Lactation Text: This medication can be used in pregnancy if certain situations. Clindamycin is also present in breast milk.
Spironolactone Pregnancy And Lactation Text: This medication can cause feminization of the male fetus and should be avoided during pregnancy. The active metabolite is also found in breast milk.
Wartpeel Counseling:  I discussed with the patient the risks of Wartpeel including but not limited to erythema, scaling, itching, weeping, crusting, and pain.
Otezla Counseling: The side effects of Otezla were discussed with the patient, including but not limited to worsening or new depression, weight loss, diarrhea, nausea, upper respiratory tract infection, and headache. Patient instructed to call the office should any adverse effect occur.  The patient verbalized understanding of the proper use and possible adverse effects of Otezla.  All the patient's questions and concerns were addressed.
Quinolones Counseling:  I discussed with the patient the risks of fluoroquinolones including but not limited to GI upset, allergic reaction, drug rash, diarrhea, dizziness, photosensitivity, yeast infections, liver function test abnormalities, tendonitis/tendon rupture.
Detail Level: Simple
Klisyri Counseling:  I discussed with the patient the risks of Klisyri including but not limited to erythema, scaling, itching, weeping, crusting, and pain.
Xolair Counseling:  Patient informed of potential adverse effects including but not limited to fever, muscle aches, rash and allergic reactions.  The patient verbalized understanding of the proper use and possible adverse effects of Xolair.  All of the patient's questions and concerns were addressed.
Tranexamic Acid Counseling:  Patient advised of the small risk of bleeding problems with tranexamic acid. They were also instructed to call if they developed any nausea, vomiting or diarrhea. All of the patient's questions and concerns were addressed.
Dapsone Counseling: I discussed with the patient the risks of dapsone including but not limited to hemolytic anemia, agranulocytosis, rashes, methemoglobinemia, kidney failure, peripheral neuropathy, headaches, GI upset, and liver toxicity.  Patients who start dapsone require monitoring including baseline LFTs and weekly CBCs for the first month, then every month thereafter.  The patient verbalized understanding of the proper use and possible adverse effects of dapsone.  All of the patient's questions and concerns were addressed.
Dutasteride Male Counseling: Dustasteride Counseling:  I discussed with the patient the risks of use of dutasteride including but not limited to decreased libido, decreased ejaculate volume, and gynecomastia. Women who can become pregnant should not handle medication.  All of the patient's questions and concerns were addressed.
Low Dose Naltrexone Pregnancy And Lactation Text: Naltrexone is pregnancy category C.  There have been no adequate and well-controlled studies in pregnant women.  It should be used in pregnancy only if the potential benefit justifies the potential risk to the fetus.   Limited data indicates that naltrexone is minimally excreted into breastmilk.
Dupixent Counseling: I discussed with the patient the risks of dupilumab including but not limited to eye infection and irritation, cold sores, injection site reactions, worsening of asthma, allergic reactions and increased risk of parasitic infection.  Live vaccines should be avoided while taking dupilumab. Dupilumab will also interact with certain medications such as warfarin and cyclosporine. The patient understands that monitoring is required and they must alert us or the primary physician if symptoms of infection or other concerning signs are noted.
Soolantra Pregnancy And Lactation Text: This medication is Pregnancy Category C. This medication is considered safe during breast feeding.
Opioid Counseling: I discussed with the patient the potential side effects of opioids including but not limited to addiction, altered mental status, and depression. I stressed avoiding alcohol, benzodiazepines, muscle relaxants and sleep aids unless specifically okayed by a physician. The patient verbalized understanding of the proper use and possible adverse effects of opioids. All of the patient's questions and concerns were addressed. They were instructed to flush the remaining pills down the toilet if they did not need them for pain.
Tranexamic Acid Pregnancy And Lactation Text: It is unknown if this medication is safe during pregnancy or breast feeding.
Benzoyl Peroxide Counseling: Patient counseled that medicine may cause skin irritation and bleach clothing.  In the event of skin irritation, the patient was advised to reduce the amount of the drug applied or use it less frequently.   The patient verbalized understanding of the proper use and possible adverse effects of benzoyl peroxide.  All of the patient's questions and concerns were addressed.
Rinvoq Counseling: I discussed with the patient the risks of Rinvoq therapy including but not limited to upper respiratory tract infections, shingles, cold sores, bronchitis, nausea, cough, fever, acne, and headache. Live vaccines should be avoided.  This medication has been linked to serious infections; higher rate of mortality; malignancy and lymphoproliferative disorders; major adverse cardiovascular events; thrombosis; thrombocytopenia, anemia, and neutropenia; lipid elevations; liver enzyme elevations; and gastrointestinal perforations.
Protopic Counseling: Patient may experience a mild burning sensation during topical application. Protopic is not approved in children less than 2 years of age. There have been case reports of hematologic and skin malignancies in patients using topical calcineurin inhibitors although causality is questionable.
Dutasteride Female Counseling: Dutasteride Counseling:  I discussed with the patient the risks of use of dutasteride including but not limited to decreased libido and sexual dysfunction. Explained the teratogenic nature of the medication and stressed the importance of not getting pregnant during treatment. All of the patient's questions and concerns were addressed.
Niacinamide Counseling: I recommended taking niacin or niacinamide, also know as vitamin B3, twice daily. Recent evidence suggests that taking vitamin B3 (500 mg twice daily) can reduce the risk of actinic keratoses and non-melanoma skin cancers. Side effects of vitamin B3 include flushing and headache.
Griseofulvin Counseling:  I discussed with the patient the risks of griseofulvin including but not limited to photosensitivity, cytopenia, liver damage, nausea/vomiting and severe allergy.  The patient understands that this medication is best absorbed when taken with a fatty meal (e.g., ice cream or french fries).
Methotrexate Pregnancy And Lactation Text: This medication is Pregnancy Category X and is known to cause fetal harm. This medication is excreted in breast milk.
Doxycycline Counseling:  Patient counseled regarding possible photosensitivity and increased risk for sunburn.  Patient instructed to avoid sunlight, if possible.  When exposed to sunlight, patients should wear protective clothing, sunglasses, and sunscreen.  The patient was instructed to call the office immediately if the following severe adverse effects occur:  hearing changes, easy bruising/bleeding, severe headache, or vision changes.  The patient verbalized understanding of the proper use and possible adverse effects of doxycycline.  All of the patient's questions and concerns were addressed.
Otezla Pregnancy And Lactation Text: This medication is Pregnancy Category C and it isn't known if it is safe during pregnancy. It is unknown if it is excreted in breast milk.
Isotretinoin Pregnancy And Lactation Text: This medication is Pregnancy Category X and is considered extremely dangerous during pregnancy. It is unknown if it is excreted in breast milk.
Topical Metronidazole Counseling: Metronidazole is a topical antibiotic medication. You may experience burning, stinging, redness, or allergic reactions.  Please call our office if you develop any problems from using this medication.
Adbry Counseling: I discussed with the patient the risks of tralokinumab including but not limited to eye infection and irritation, cold sores, injection site reactions, worsening of asthma, allergic reactions and increased risk of parasitic infection.  Live vaccines should be avoided while taking tralokinumab. The patient understands that monitoring is required and they must alert us or the primary physician if symptoms of infection or other concerning signs are noted.
Topical Metronidazole Pregnancy And Lactation Text: This medication is Pregnancy Category B and considered safe during pregnancy.  It is also considered safe to use while breastfeeding.
Griseofulvin Pregnancy And Lactation Text: This medication is Pregnancy Category X and is known to cause serious birth defects. It is unknown if this medication is excreted in breast milk but breast feeding should be avoided.
Elidel Counseling: Patient may experience a mild burning sensation during topical application. Elidel is not approved in children less than 2 years of age. There have been case reports of hematologic and skin malignancies in patients using topical calcineurin inhibitors although causality is questionable.
Oxybutynin Counseling:  I discussed with the patient the risks of oxybutynin including but not limited to skin rash, drowsiness, dry mouth, difficulty urinating, and blurred vision.
Klisyri Pregnancy And Lactation Text: It is unknown if this medication can harm a developing fetus or if it is excreted in breast milk.
Dapsone Pregnancy And Lactation Text: This medication is Pregnancy Category C and is not considered safe during pregnancy or breast feeding.
Xolair Pregnancy And Lactation Text: This medication is Pregnancy Category B and is considered safe during pregnancy. This medication is excreted in breast milk.
Dupixent Pregnancy And Lactation Text: This medication likely crosses the placenta but the risk for the fetus is uncertain. This medication is excreted in breast milk.
Rifampin Counseling: I discussed with the patient the risks of rifampin including but not limited to liver damage, kidney damage, red-orange body fluids, nausea/vomiting and severe allergy.
Ilumya Counseling: I discussed with the patient the risks of tildrakizumab including but not limited to immunosuppression, malignancy, posterior leukoencephalopathy syndrome, and serious infections.  The patient understands that monitoring is required including a PPD at baseline and must alert us or the primary physician if symptoms of infection or other concerning signs are noted.
Azathioprine Counseling:  I discussed with the patient the risks of azathioprine including but not limited to myelosuppression, immunosuppression, hepatotoxicity, lymphoma, and infections.  The patient understands that monitoring is required including baseline LFTs, Creatinine, possible TPMP genotyping and weekly CBCs for the first month and then every 2 weeks thereafter.  The patient verbalized understanding of the proper use and possible adverse effects of azathioprine.  All of the patient's questions and concerns were addressed.
Enbrel Counseling:  I discussed with the patient the risks of etanercept including but not limited to myelosuppression, immunosuppression, autoimmune hepatitis, demyelinating diseases, lymphoma, and infections.  The patient understands that monitoring is required including a PPD at baseline and must alert us or the primary physician if symptoms of infection or other concerning signs are noted.
Topical Retinoid counseling:  Patient advised to apply a pea-sized amount only at bedtime and wait 30 minutes after washing their face before applying.  If too drying, patient may add a non-comedogenic moisturizer. The patient verbalized understanding of the proper use and possible adverse effects of retinoids.  All of the patient's questions and concerns were addressed.
Benzoyl Peroxide Pregnancy And Lactation Text: This medication is Pregnancy Category C. It is unknown if benzoyl peroxide is excreted in breast milk.
Protopic Pregnancy And Lactation Text: This medication is Pregnancy Category C. It is unknown if this medication is excreted in breast milk when applied topically.
Opioid Pregnancy And Lactation Text: These medications can lead to premature delivery and should be avoided during pregnancy. These medications are also present in breast milk in small amounts.
Azithromycin Counseling:  I discussed with the patient the risks of azithromycin including but not limited to GI upset, allergic reaction, drug rash, diarrhea, and yeast infections.
Minoxidil Counseling: Minoxidil is a topical medication which can increase blood flow where it is applied. It is uncertain how this medication increases hair growth. Side effects are uncommon and include stinging and allergic reactions.
Dutasteride Pregnancy And Lactation Text: This medication is absolutely contraindicated in women, especially during pregnancy and breast feeding. Feminization of male fetuses is possible if taking while pregnant.
Valtrex Counseling: I discussed with the patient the risks of valacyclovir including but not limited to kidney damage, nausea, vomiting and severe allergy.  The patient understands that if the infection seems to be worsening or is not improving, they are to call.
Rinvoq Pregnancy And Lactation Text: Based on animal studies, Rinvoq may cause embryo-fetal harm when administered to pregnant women.  The medication should not be used in pregnancy.  Breastfeeding is not recommended during treatment and for 6 days after the last dose.
Niacinamide Pregnancy And Lactation Text: These medications are considered safe during pregnancy.
Doxepin Counseling:  Patient advised that the medication is sedating and not to drive a car after taking this medication. Patient informed of potential adverse effects including but not limited to dry mouth, urinary retention, and blurry vision.  The patient verbalized understanding of the proper use and possible adverse effects of doxepin.  All of the patient's questions and concerns were addressed.
High Dose Vitamin A Counseling: Side effects reviewed, pt to contact office should one occur.
Doxycycline Pregnancy And Lactation Text: This medication is Pregnancy Category D and not consider safe during pregnancy. It is also excreted in breast milk but is considered safe for shorter treatment courses.
Winlevi Counseling:  I discussed with the patient the risks of topical clascoterone including but not limited to erythema, scaling, itching, and stinging. Patient voiced their understanding.
Skyrizi Counseling: I discussed with the patient the risks of risankizumab-rzaa including but not limited to immunosuppression, and serious infections.  The patient understands that monitoring is required including a PPD at baseline and must alert us or the primary physician if symptoms of infection or other concerning signs are noted.
Prednisone Counseling:  I discussed with the patient the risks of prolonged use of prednisone including but not limited to weight gain, insomnia, osteoporosis, mood changes, diabetes, susceptibility to infection, glaucoma and high blood pressure.  In cases where prednisone use is prolonged, patients should be monitored with blood pressure checks, serum glucose levels and an eye exam.  Additionally, the patient may need to be placed on GI prophylaxis, PCP prophylaxis, and calcium and vitamin D supplementation and/or a bisphosphonate.  The patient verbalized understanding of the proper use and the possible adverse effects of prednisone.  All of the patient's questions and concerns were addressed.
Erythromycin Counseling:  I discussed with the patient the risks of erythromycin including but not limited to GI upset, allergic reaction, drug rash, diarrhea, increase in liver enzymes, and yeast infections.
Erivedge Counseling- I discussed with the patient the risks of Erivedge including but not limited to nausea, vomiting, diarrhea, constipation, weight loss, changes in the sense of taste, decreased appetite, muscle spasms, and hair loss.  The patient verbalized understanding of the proper use and possible adverse effects of Erivedge.  All of the patient's questions and concerns were addressed.
Doxepin Pregnancy And Lactation Text: This medication is Pregnancy Category C and it isn't known if it is safe during pregnancy. It is also excreted in breast milk and breast feeding isn't recommended.
Itraconazole Counseling:  I discussed with the patient the risks of itraconazole including but not limited to liver damage, nausea/vomiting, neuropathy, and severe allergy.  The patient understands that this medication is best absorbed when taken with acidic beverages such as non-diet cola or ginger ale.  The patient understands that monitoring is required including baseline LFTs and repeat LFTs at intervals.  The patient understands that they are to contact us or the primary physician if concerning signs are noted.
Topical Steroids Counseling: I discussed with the patient that prolonged use of topical steroids can result in the increased appearance of superficial blood vessels (telangiectasias), lightening (hypopigmentation) and thinning of the skin (atrophy).  Patient understands to avoid using high potency steroids in skin folds, the groin or the face.  The patient verbalized understanding of the proper use and possible adverse effects of topical steroids.  All of the patient's questions and concerns were addressed.
High Dose Vitamin A Pregnancy And Lactation Text: High dose vitamin A therapy is contraindicated during pregnancy and breast feeding.
Winlevi Pregnancy And Lactation Text: This medication is considered safe during pregnancy and breastfeeding.
Rifampin Pregnancy And Lactation Text: This medication is Pregnancy Category C and it isn't know if it is safe during pregnancy. It is also excreted in breast milk and should not be used if you are breast feeding.
Gabapentin Counseling: I discussed with the patient the risks of gabapentin including but not limited to dizziness, somnolence, fatigue and ataxia.
Adbry Pregnancy And Lactation Text: It is unknown if this medication will adversely affect pregnancy or breast feeding.

## 2024-05-14 ENCOUNTER — APPOINTMENT (OUTPATIENT)
Dept: URBAN - METROPOLITAN AREA CLINIC 207 | Age: 29
Setting detail: DERMATOLOGY
End: 2024-05-15

## 2024-05-14 DIAGNOSIS — Z41.9 ENCOUNTER FOR PROCEDURE FOR PURPOSES OTHER THAN REMEDYING HEALTH STATE, UNSPECIFIED: ICD-10-CM

## 2024-05-14 PROCEDURE — OTHER FACIAL: OTHER

## 2024-05-14 PROCEDURE — OTHER DERMAPLANE: OTHER

## 2024-05-14 ASSESSMENT — LOCATION DETAILED DESCRIPTION DERM
LOCATION DETAILED: RIGHT MENTAL CREASE
LOCATION DETAILED: LEFT INFERIOR CENTRAL MALAR CHEEK
LOCATION DETAILED: RIGHT INFERIOR CENTRAL MALAR CHEEK
LOCATION DETAILED: LEFT MEDIAL FOREHEAD
LOCATION DETAILED: LEFT CHIN
LOCATION DETAILED: LEFT INFERIOR MEDIAL FOREHEAD

## 2024-05-14 ASSESSMENT — LOCATION SIMPLE DESCRIPTION DERM
LOCATION SIMPLE: CHIN
LOCATION SIMPLE: LEFT FOREHEAD
LOCATION SIMPLE: RIGHT CHEEK
LOCATION SIMPLE: LEFT CHEEK

## 2024-05-14 ASSESSMENT — LOCATION ZONE DERM: LOCATION ZONE: FACE

## 2024-05-14 NOTE — PROCEDURE: DERMAPLANE
Detail Level: Zone
Price (Use Numbers Only, No Special Characters Or $): 50
Post-Procedure Instructions: Following the dermaplane procedure, revision soothing facial rinse was applied to the treatment areas. Moisturizer and SPF was applied.
Post-Care Instructions: I reviewed with the patient in detail post-care instructions.
Treatment Areas: face and neck
Pre-Procedure Text: The patient was placed in a recumbant position on the procedure table.
Blade: 10R blade scalpel

## 2024-05-14 NOTE — PROCEDURE: FACIAL
Exfoliation Type: dermaplane
Treatment Type (Optional): Anti Aging Facial
Detail Level: Zone
Treatment Serum Override: hydrinity restorative serum
Facial Steaming: steamed
Mask Type (Optional): papaya
Extraction Method: cotton-tipped applicator
Price (Use Numbers Only, No Special Characters Or $): 175

## 2024-06-10 ENCOUNTER — APPOINTMENT (OUTPATIENT)
Dept: URBAN - METROPOLITAN AREA CLINIC 207 | Age: 29
Setting detail: DERMATOLOGY
End: 2024-06-10

## 2024-06-10 DIAGNOSIS — L81.0 POSTINFLAMMATORY HYPERPIGMENTATION: ICD-10-CM

## 2024-06-10 DIAGNOSIS — D22 MELANOCYTIC NEVI: ICD-10-CM

## 2024-06-10 DIAGNOSIS — L91.8 OTHER HYPERTROPHIC DISORDERS OF THE SKIN: ICD-10-CM

## 2024-06-10 DIAGNOSIS — Q828 OTHER SPECIFIED ANOMALIES OF SKIN: ICD-10-CM

## 2024-06-10 DIAGNOSIS — Q819 OTHER SPECIFIED ANOMALIES OF SKIN: ICD-10-CM

## 2024-06-10 DIAGNOSIS — L82.1 OTHER SEBORRHEIC KERATOSIS: ICD-10-CM

## 2024-06-10 DIAGNOSIS — Q826 OTHER SPECIFIED ANOMALIES OF SKIN: ICD-10-CM

## 2024-06-10 PROBLEM — L85.8 OTHER SPECIFIED EPIDERMAL THICKENING: Status: ACTIVE | Noted: 2024-06-10

## 2024-06-10 PROBLEM — D22.9 MELANOCYTIC NEVI, UNSPECIFIED: Status: ACTIVE | Noted: 2024-06-10

## 2024-06-10 PROCEDURE — OTHER DEFER: OTHER

## 2024-06-10 PROCEDURE — OTHER PRESCRIPTION: OTHER

## 2024-06-10 PROCEDURE — OTHER COUNSELING: OTHER

## 2024-06-10 PROCEDURE — 99213 OFFICE O/P EST LOW 20 MIN: CPT

## 2024-06-10 PROCEDURE — OTHER MEDICATION COUNSELING: OTHER

## 2024-06-10 PROCEDURE — OTHER MIPS QUALITY: OTHER

## 2024-06-10 PROCEDURE — OTHER RECOMMENDATIONS: OTHER

## 2024-06-10 RX ORDER — HYDROQUINONE 6 %
EMULSION (GRAM) TOPICAL
Qty: 30 | Refills: 0 | Status: ERX

## 2024-06-10 ASSESSMENT — LOCATION SIMPLE DESCRIPTION DERM
LOCATION SIMPLE: RIGHT ELBOW
LOCATION SIMPLE: RIGHT POSTERIOR UPPER ARM
LOCATION SIMPLE: RIGHT CHEEK
LOCATION SIMPLE: RIGHT KNEE
LOCATION SIMPLE: LEFT ELBOW
LOCATION SIMPLE: LEFT AXILLARY VAULT
LOCATION SIMPLE: LEFT POSTERIOR THIGH
LOCATION SIMPLE: POSTERIOR NECK
LOCATION SIMPLE: LEFT POSTERIOR UPPER ARM
LOCATION SIMPLE: LEFT ANTERIOR NECK
LOCATION SIMPLE: LEFT ANKLE
LOCATION SIMPLE: RIGHT ANKLE
LOCATION SIMPLE: RIGHT POSTERIOR THIGH
LOCATION SIMPLE: LEFT KNEE

## 2024-06-10 ASSESSMENT — LOCATION DETAILED DESCRIPTION DERM
LOCATION DETAILED: LEFT AXILLARY VAULT
LOCATION DETAILED: RIGHT DISTAL POSTERIOR THIGH
LOCATION DETAILED: LEFT PROXIMAL POSTERIOR UPPER ARM
LOCATION DETAILED: LEFT DISTAL POSTERIOR THIGH
LOCATION DETAILED: RIGHT ELBOW
LOCATION DETAILED: RIGHT PROXIMAL POSTERIOR UPPER ARM
LOCATION DETAILED: MID POSTERIOR NECK
LOCATION DETAILED: LEFT KNEE
LOCATION DETAILED: RIGHT ANKLE
LOCATION DETAILED: LEFT ANKLE
LOCATION DETAILED: LEFT INFERIOR ANTERIOR NECK
LOCATION DETAILED: RIGHT MEDIAL MALAR CHEEK
LOCATION DETAILED: LEFT ELBOW
LOCATION DETAILED: RIGHT KNEE

## 2024-06-10 ASSESSMENT — LOCATION ZONE DERM
LOCATION ZONE: NECK
LOCATION ZONE: FACE
LOCATION ZONE: AXILLAE
LOCATION ZONE: LEG
LOCATION ZONE: ARM

## 2024-06-10 NOTE — PROCEDURE: MEDICATION COUNSELING
Tremfya Counseling: I discussed with the patient the risks of guselkumab including but not limited to immunosuppression, serious infections, and drug reactions.  The patient understands that monitoring is required including a PPD at baseline and must alert us or the primary physician if symptoms of infection or other concerning signs are noted.
Minocycline Pregnancy And Lactation Text: This medication is Pregnancy Category D and not consider safe during pregnancy. It is also excreted in breast milk.
Odomzo Pregnancy And Lactation Text: This medication is Pregnancy Category X and is absolutely contraindicated during pregnancy. It is unknown if it is excreted in breast milk.
Picato Pregnancy And Lactation Text: This medication is Pregnancy Category C. It is unknown if this medication is excreted in breast milk.
Azelaic Acid Pregnancy And Lactation Text: This medication is considered safe during pregnancy and breast feeding.
Cimzia Counseling:  I discussed with the patient the risks of Cimzia including but not limited to immunosuppression, allergic reactions and infections.  The patient understands that monitoring is required including a PPD at baseline and must alert us or the primary physician if symptoms of infection or other concerning signs are noted.
Niacinamide Counseling: I recommended taking niacin or niacinamide, also know as vitamin B3, twice daily. Recent evidence suggests that taking vitamin B3 (500 mg twice daily) can reduce the risk of actinic keratoses and non-melanoma skin cancers. Side effects of vitamin B3 include flushing and headache.
Wartpeel Pregnancy And Lactation Text: This medication is Pregnancy Category X and contraindicated in pregnancy and in women who may become pregnant. It is unknown if this medication is excreted in breast milk.
Otezla Pregnancy And Lactation Text: This medication is Pregnancy Category C and it isn't known if it is safe during pregnancy. It is unknown if it is excreted in breast milk.
Rinvoq Counseling: I discussed with the patient the risks of Rinvoq therapy including but not limited to upper respiratory tract infections, shingles, cold sores, bronchitis, nausea, cough, fever, acne, and headache. Live vaccines should be avoided.  This medication has been linked to serious infections; higher rate of mortality; malignancy and lymphoproliferative disorders; major adverse cardiovascular events; thrombosis; thrombocytopenia, anemia, and neutropenia; lipid elevations; liver enzyme elevations; and gastrointestinal perforations.
Terbinafine Pregnancy And Lactation Text: This medication is Pregnancy Category B and is considered safe during pregnancy. It is also excreted in breast milk and breast feeding isn't recommended.
Dapsone Pregnancy And Lactation Text: This medication is Pregnancy Category C and is not considered safe during pregnancy or breast feeding.
Soolantra Pregnancy And Lactation Text: This medication is Pregnancy Category C. This medication is considered safe during breast feeding.
Bexarotene Pregnancy And Lactation Text: This medication is Pregnancy Category X and should not be given to women who are pregnant or may become pregnant. This medication should not be used if you are breast feeding.
Topical Ketoconazole Counseling: Patient counseled that this medication may cause skin irritation or allergic reactions.  In the event of skin irritation, the patient was advised to reduce the amount of the drug applied or use it less frequently.   The patient verbalized understanding of the proper use and possible adverse effects of ketoconazole.  All of the patient's questions and concerns were addressed.
Oxybutynin Counseling:  I discussed with the patient the risks of oxybutynin including but not limited to skin rash, drowsiness, dry mouth, difficulty urinating, and blurred vision.
Opioid Counseling: I discussed with the patient the potential side effects of opioids including but not limited to addiction, altered mental status, and depression. I stressed avoiding alcohol, benzodiazepines, muscle relaxants and sleep aids unless specifically okayed by a physician. The patient verbalized understanding of the proper use and possible adverse effects of opioids. All of the patient's questions and concerns were addressed. They were instructed to flush the remaining pills down the toilet if they did not need them for pain.
Tranexamic Acid Pregnancy And Lactation Text: It is unknown if this medication is safe during pregnancy or breast feeding.
Fluconazole Counseling:  Patient counseled regarding adverse effects of fluconazole including but not limited to headache, diarrhea, nausea, upset stomach, liver function test abnormalities, taste disturbance, and stomach pain.  There is a rare possibility of liver failure that can occur when taking fluconazole.  The patient understands that monitoring of LFTs and kidney function test may be required, especially at baseline. The patient verbalized understanding of the proper use and possible adverse effects of fluconazole.  All of the patient's questions and concerns were addressed.
Siliq Pregnancy And Lactation Text: The risk during pregnancy and breastfeeding is uncertain with this medication.
Cyclosporine Pregnancy And Lactation Text: This medication is Pregnancy Category C and it isn't know if it is safe during pregnancy. This medication is excreted in breast milk.
Klisyri Counseling:  I discussed with the patient the risks of Klisyri including but not limited to erythema, scaling, itching, weeping, crusting, and pain.
Clindamycin Counseling: I counseled the patient regarding use of clindamycin as an antibiotic for prophylactic and/or therapeutic purposes. Clindamycin is active against numerous classes of bacteria, including skin bacteria. Side effects may include nausea, diarrhea, gastrointestinal upset, rash, hives, yeast infections, and in rare cases, colitis.
Include Pregnancy/Lactation Warning?: No
Spironolactone Counseling: Patient advised regarding risks of diarrhea, abdominal pain, hyperkalemia, birth defects (for female patients), liver toxicity and renal toxicity. The patient may need blood work to monitor liver and kidney function and potassium levels while on therapy. The patient verbalized understanding of the proper use and possible adverse effects of spironolactone.  All of the patient's questions and concerns were addressed.
Ivermectin Counseling:  Patient instructed to take medication on an empty stomach with a full glass of water.  Patient informed of potential adverse effects including but not limited to nausea, diarrhea, dizziness, itching, and swelling of the extremities or lymph nodes.  The patient verbalized understanding of the proper use and possible adverse effects of ivermectin.  All of the patient's questions and concerns were addressed.
Winlevi Counseling:  I discussed with the patient the risks of topical clascoterone including but not limited to erythema, scaling, itching, and stinging. Patient voiced their understanding.
Opioid Pregnancy And Lactation Text: These medications can lead to premature delivery and should be avoided during pregnancy. These medications are also present in breast milk in small amounts.
Niacinamide Pregnancy And Lactation Text: These medications are considered safe during pregnancy.
Fluconazole Pregnancy And Lactation Text: This medication is Pregnancy Category C and it isn't know if it is safe during pregnancy. It is also excreted in breast milk.
Spironolactone Pregnancy And Lactation Text: This medication can cause feminization of the male fetus and should be avoided during pregnancy. The active metabolite is also found in breast milk.
Cimetidine Counseling:  I discussed with the patient the risks of Cimetidine including but not limited to gynecomastia, headache, diarrhea, nausea, drowsiness, arrhythmias, pancreatitis, skin rashes, psychosis, bone marrow suppression and kidney toxicity.
Simponi Counseling:  I discussed with the patient the risks of golimumab including but not limited to myelosuppression, immunosuppression, autoimmune hepatitis, demyelinating diseases, lymphoma, and serious infections.  The patient understands that monitoring is required including a PPD at baseline and must alert us or the primary physician if symptoms of infection or other concerning signs are noted.
Valtrex Counseling: I discussed with the patient the risks of valacyclovir including but not limited to kidney damage, nausea, vomiting and severe allergy.  The patient understands that if the infection seems to be worsening or is not improving, they are to call.
Klisyri Pregnancy And Lactation Text: It is unknown if this medication can harm a developing fetus or if it is excreted in breast milk.
Clindamycin Pregnancy And Lactation Text: This medication can be used in pregnancy if certain situations. Clindamycin is also present in breast milk.
Isotretinoin Counseling: Patient should get monthly blood tests, not donate blood, not drive at night if vision affected, not share medication, and not undergo elective surgery for 6 months after tx completed. Side effects reviewed, pt to contact office should one occur.
Hyrimoz Counseling:  I discussed with the patient the risks of adalimumab including but not limited to myelosuppression, immunosuppression, autoimmune hepatitis, demyelinating diseases, lymphoma, and serious infections.  The patient understands that monitoring is required including a PPD at baseline and must alert us or the primary physician if symptoms of infection or other concerning signs are noted.
Rinvoq Pregnancy And Lactation Text: Based on animal studies, Rinvoq may cause embryo-fetal harm when administered to pregnant women.  The medication should not be used in pregnancy.  Breastfeeding is not recommended during treatment and for 6 days after the last dose.
Protopic Counseling: Patient may experience a mild burning sensation during topical application. Protopic is not approved in children less than 2 years of age. There have been case reports of hematologic and skin malignancies in patients using topical calcineurin inhibitors although causality is questionable.
Quinolones Counseling:  I discussed with the patient the risks of fluoroquinolones including but not limited to GI upset, allergic reaction, drug rash, diarrhea, dizziness, photosensitivity, yeast infections, liver function test abnormalities, tendonitis/tendon rupture.
Cimzia Pregnancy And Lactation Text: This medication crosses the placenta but can be considered safe in certain situations. Cimzia may be excreted in breast milk.
Benzoyl Peroxide Counseling: Patient counseled that medicine may cause skin irritation and bleach clothing.  In the event of skin irritation, the patient was advised to reduce the amount of the drug applied or use it less frequently.   The patient verbalized understanding of the proper use and possible adverse effects of benzoyl peroxide.  All of the patient's questions and concerns were addressed.
Elidel Counseling: Patient may experience a mild burning sensation during topical application. Elidel is not approved in children less than 2 years of age. There have been case reports of hematologic and skin malignancies in patients using topical calcineurin inhibitors although causality is questionable.
Hyrimoz Pregnancy And Lactation Text: This medication is Pregnancy Category B and is considered safe during pregnancy. It is unknown if this medication is excreted in breast milk.
Dutasteride Male Counseling: Dustasteride Counseling:  I discussed with the patient the risks of use of dutasteride including but not limited to decreased libido, decreased ejaculate volume, and gynecomastia. Women who can become pregnant should not handle medication.  All of the patient's questions and concerns were addressed.
Cosentyx Counseling:  I discussed with the patient the risks of Cosentyx including but not limited to worsening of Crohn's disease, immunosuppression, allergic reactions and infections.  The patient understands that monitoring is required including a PPD at baseline and must alert us or the primary physician if symptoms of infection or other concerning signs are noted.
Protopic Pregnancy And Lactation Text: This medication is Pregnancy Category C. It is unknown if this medication is excreted in breast milk when applied topically.
Xolair Counseling:  Patient informed of potential adverse effects including but not limited to fever, muscle aches, rash and allergic reactions.  The patient verbalized understanding of the proper use and possible adverse effects of Xolair.  All of the patient's questions and concerns were addressed.
Benzoyl Peroxide Pregnancy And Lactation Text: This medication is Pregnancy Category C. It is unknown if benzoyl peroxide is excreted in breast milk.
Ivermectin Pregnancy And Lactation Text: This medication is Pregnancy Category C and it isn't known if it is safe during pregnancy. It is also excreted in breast milk.
Methotrexate Counseling:  Patient counseled regarding adverse effects of methotrexate including but not limited to nausea, vomiting, abnormalities in liver function tests. Patients may develop mouth sores, rash, diarrhea, and abnormalities in blood counts. The patient understands that monitoring is required including LFT's and blood counts.  There is a rare possibility of scarring of the liver and lung problems that can occur when taking methotrexate. Persistent nausea, loss of appetite, pale stools, dark urine, cough, and shortness of breath should be reported immediately. Patient advised to discontinue methotrexate treatment at least three months before attempting to become pregnant.  I discussed the need for folate supplements while taking methotrexate.  These supplements can decrease side effects during methotrexate treatment. The patient verbalized understanding of the proper use and possible adverse effects of methotrexate.  All of the patient's questions and concerns were addressed.
Gabapentin Counseling: I discussed with the patient the risks of gabapentin including but not limited to dizziness, somnolence, fatigue and ataxia.
Topical Retinoid counseling:  Patient advised to apply a pea-sized amount only at bedtime and wait 30 minutes after washing their face before applying.  If too drying, patient may add a non-comedogenic moisturizer. The patient verbalized understanding of the proper use and possible adverse effects of retinoids.  All of the patient's questions and concerns were addressed.
Topical Ketoconazole Pregnancy And Lactation Text: This medication is Pregnancy Category B and is considered safe during pregnancy. It is unknown if it is excreted in breast milk.
Methotrexate Pregnancy And Lactation Text: This medication is Pregnancy Category X and is known to cause fetal harm. This medication is excreted in breast milk.
Minoxidil Counseling: Minoxidil is a topical medication which can increase blood flow where it is applied. It is uncertain how this medication increases hair growth. Side effects are uncommon and include stinging and allergic reactions.
Doxycycline Counseling:  Patient counseled regarding possible photosensitivity and increased risk for sunburn.  Patient instructed to avoid sunlight, if possible.  When exposed to sunlight, patients should wear protective clothing, sunglasses, and sunscreen.  The patient was instructed to call the office immediately if the following severe adverse effects occur:  hearing changes, easy bruising/bleeding, severe headache, or vision changes.  The patient verbalized understanding of the proper use and possible adverse effects of doxycycline.  All of the patient's questions and concerns were addressed.
Isotretinoin Pregnancy And Lactation Text: This medication is Pregnancy Category X and is considered extremely dangerous during pregnancy. It is unknown if it is excreted in breast milk.
Gabapentin Pregnancy And Lactation Text: This medication is Pregnancy Category C and isn't considered safe during pregnancy. It is excreted in breast milk.
Topical Metronidazole Counseling: Metronidazole is a topical antibiotic medication. You may experience burning, stinging, redness, or allergic reactions.  Please call our office if you develop any problems from using this medication.
Nsaids Counseling: NSAID Counseling: I discussed with the patient that NSAIDs should be taken with food. Prolonged use of NSAIDs can result in the development of stomach ulcers.  Patient advised to stop taking NSAIDs if abdominal pain occurs.  The patient verbalized understanding of the proper use and possible adverse effects of NSAIDs.  All of the patient's questions and concerns were addressed.
Winlevi Pregnancy And Lactation Text: This medication is considered safe during pregnancy and breastfeeding.
Valtrex Pregnancy And Lactation Text: this medication is Pregnancy Category B and is considered safe during pregnancy. This medication is not directly found in breast milk but it's metabolite acyclovir is present.
Griseofulvin Counseling:  I discussed with the patient the risks of griseofulvin including but not limited to photosensitivity, cytopenia, liver damage, nausea/vomiting and severe allergy.  The patient understands that this medication is best absorbed when taken with a fatty meal (e.g., ice cream or french fries).
Sotyktu Counseling:  I discussed the most common side effects of Sotyktu including: common cold, sore throat, sinus infections, cold sores, canker sores, folliculitis, and acne.? I also discussed more serious side effects of Sotyktu including but not limited to: serious allergic reactions; increased risk for infections such as TB; cancers such as lymphomas; rhabdomyolysis and elevated CPK; and elevated triglycerides and liver enzymes.?
Qbrexza Counseling:  I discussed with the patient the risks of Qbrexza including but not limited to headache, mydriasis, blurred vision, dry eyes, nasal dryness, dry mouth, dry throat, dry skin, urinary hesitation, and constipation.  Local skin reactions including erythema, burning, stinging, and itching can also occur.
Arava Counseling:  Patient counseled regarding adverse effects of Arava including but not limited to nausea, vomiting, abnormalities in liver function tests. Patients may develop mouth sores, rash, diarrhea, and abnormalities in blood counts. The patient understands that monitoring is required including LFTs and blood counts.  There is a rare possibility of scarring of the liver and lung problems that can occur when taking methotrexate. Persistent nausea, loss of appetite, pale stools, dark urine, cough, and shortness of breath should be reported immediately. Patient advised to discontinue Arava treatment and consult with a physician prior to attempting conception. The patient will have to undergo a treatment to eliminate Arava from the body prior to conception.
Xolair Pregnancy And Lactation Text: This medication is Pregnancy Category B and is considered safe during pregnancy. This medication is excreted in breast milk.
Carac Counseling:  I discussed with the patient the risks of Carac including but not limited to erythema, scaling, itching, weeping, crusting, and pain.
VTAMA Counseling: I discussed with the patient that VTAMA is not for use in the eyes, mouth or mouth. They should call the office if they develop any signs of allergic reactions to VTAMA. The patient verbalized understanding of the proper use and possible adverse effects of VTAMA.  All of the patient's questions and concerns were addressed.
Adbry Counseling: I discussed with the patient the risks of tralokinumab including but not limited to eye infection and irritation, cold sores, injection site reactions, worsening of asthma, allergic reactions and increased risk of parasitic infection.  Live vaccines should be avoided while taking tralokinumab. The patient understands that monitoring is required and they must alert us or the primary physician if symptoms of infection or other concerning signs are noted.
Rifampin Counseling: I discussed with the patient the risks of rifampin including but not limited to liver damage, kidney damage, red-orange body fluids, nausea/vomiting and severe allergy.
Nsaids Pregnancy And Lactation Text: These medications are considered safe up to 30 weeks gestation. It is excreted in breast milk.
Propranolol Counseling:  I discussed with the patient the risks of propranolol including but not limited to low heart rate, low blood pressure, low blood sugar, restlessness and increased cold sensitivity. They should call the office if they experience any of these side effects.
Ilumya Counseling: I discussed with the patient the risks of tildrakizumab including but not limited to immunosuppression, malignancy, posterior leukoencephalopathy syndrome, and serious infections.  The patient understands that monitoring is required including a PPD at baseline and must alert us or the primary physician if symptoms of infection or other concerning signs are noted.
Azathioprine Counseling:  I discussed with the patient the risks of azathioprine including but not limited to myelosuppression, immunosuppression, hepatotoxicity, lymphoma, and infections.  The patient understands that monitoring is required including baseline LFTs, Creatinine, possible TPMP genotyping and weekly CBCs for the first month and then every 2 weeks thereafter.  The patient verbalized understanding of the proper use and possible adverse effects of azathioprine.  All of the patient's questions and concerns were addressed.
Dutasteride Female Counseling: Dutasteride Counseling:  I discussed with the patient the risks of use of dutasteride including but not limited to decreased libido and sexual dysfunction. Explained the teratogenic nature of the medication and stressed the importance of not getting pregnant during treatment. All of the patient's questions and concerns were addressed.
Topical Metronidazole Pregnancy And Lactation Text: This medication is Pregnancy Category B and considered safe during pregnancy.  It is also considered safe to use while breastfeeding.
Doxycycline Pregnancy And Lactation Text: This medication is Pregnancy Category D and not consider safe during pregnancy. It is also excreted in breast milk but is considered safe for shorter treatment courses.
Glycopyrrolate Counseling:  I discussed with the patient the risks of glycopyrrolate including but not limited to skin rash, drowsiness, dry mouth, difficulty urinating, and blurred vision.
Dutasteride Pregnancy And Lactation Text: This medication is absolutely contraindicated in women, especially during pregnancy and breast feeding. Feminization of male fetuses is possible if taking while pregnant.
Sotyktu Pregnancy And Lactation Text: There is insufficient data to evaluate whether or not Sotyktu is safe to use during pregnancy.? ?It is not known if Sotyktu passes into breast milk and whether or not it is safe to use when breastfeeding.??
Propranolol Pregnancy And Lactation Text: This medication is Pregnancy Category C and it isn't known if it is safe during pregnancy. It is excreted in breast milk.
Eucrisa Counseling: Patient may experience a mild burning sensation during topical application. Eucrisa is not approved in children less than 3 months of age.
Azithromycin Counseling:  I discussed with the patient the risks of azithromycin including but not limited to GI upset, allergic reaction, drug rash, diarrhea, and yeast infections.
Griseofulvin Pregnancy And Lactation Text: This medication is Pregnancy Category X and is known to cause serious birth defects. It is unknown if this medication is excreted in breast milk but breast feeding should be avoided.
Doxepin Counseling:  Patient advised that the medication is sedating and not to drive a car after taking this medication. Patient informed of potential adverse effects including but not limited to dry mouth, urinary retention, and blurry vision.  The patient verbalized understanding of the proper use and possible adverse effects of doxepin.  All of the patient's questions and concerns were addressed.
Minoxidil Pregnancy And Lactation Text: This medication has not been assigned a Pregnancy Risk Category but animal studies failed to show danger with the topical medication. It is unknown if the medication is excreted in breast milk.
High Dose Vitamin A Counseling: Side effects reviewed, pt to contact office should one occur.
Skyrizi Counseling: I discussed with the patient the risks of risankizumab-rzaa including but not limited to immunosuppression, and serious infections.  The patient understands that monitoring is required including a PPD at baseline and must alert us or the primary physician if symptoms of infection or other concerning signs are noted.
Tazorac Counseling:  Patient advised that medication is irritating and drying.  Patient may need to apply sparingly and wash off after an hour before eventually leaving it on overnight.  The patient verbalized understanding of the proper use and possible adverse effects of tazorac.  All of the patient's questions and concerns were addressed.
Prednisone Counseling:  I discussed with the patient the risks of prolonged use of prednisone including but not limited to weight gain, insomnia, osteoporosis, mood changes, diabetes, susceptibility to infection, glaucoma and high blood pressure.  In cases where prednisone use is prolonged, patients should be monitored with blood pressure checks, serum glucose levels and an eye exam.  Additionally, the patient may need to be placed on GI prophylaxis, PCP prophylaxis, and calcium and vitamin D supplementation and/or a bisphosphonate.  The patient verbalized understanding of the proper use and the possible adverse effects of prednisone.  All of the patient's questions and concerns were addressed.
Adbry Pregnancy And Lactation Text: It is unknown if this medication will adversely affect pregnancy or breast feeding.
Rifampin Pregnancy And Lactation Text: This medication is Pregnancy Category C and it isn't know if it is safe during pregnancy. It is also excreted in breast milk and should not be used if you are breast feeding.
Dupixent Counseling: I discussed with the patient the risks of dupilumab including but not limited to eye infection and irritation, cold sores, injection site reactions, worsening of asthma, allergic reactions and increased risk of parasitic infection.  Live vaccines should be avoided while taking dupilumab. Dupilumab will also interact with certain medications such as warfarin and cyclosporine. The patient understands that monitoring is required and they must alert us or the primary physician if symptoms of infection or other concerning signs are noted.
Olanzapine Counseling- I discussed with the patient the common side effects of olanzapine including but are not limited to: lack of energy, dry mouth, increased appetite, sleepiness, tremor, constipation, dizziness, changes in behavior, or restlessness.  Explained that teenagers are more likely to experience headaches, abdominal pain, pain in the arms or legs, tiredness, and sleepiness.  Serious side effects include but are not limited: increased risk of death in elderly patients who are confused, have memory loss, or dementia-related psychosis; hyperglycemia; increased cholesterol and triglycerides; and weight gain.
Itraconazole Counseling:  I discussed with the patient the risks of itraconazole including but not limited to liver damage, nausea/vomiting, neuropathy, and severe allergy.  The patient understands that this medication is best absorbed when taken with acidic beverages such as non-diet cola or ginger ale.  The patient understands that monitoring is required including baseline LFTs and repeat LFTs at intervals.  The patient understands that they are to contact us or the primary physician if concerning signs are noted.
Doxepin Pregnancy And Lactation Text: This medication is Pregnancy Category C and it isn't known if it is safe during pregnancy. It is also excreted in breast milk and breast feeding isn't recommended.
Mirvaso Counseling: Mirvaso is a topical medication which can decrease superficial blood flow where applied. Side effects are uncommon and include stinging, redness and allergic reactions.
Vtama Pregnancy And Lactation Text: It is unknown if this medication can cause problems during pregnancy and breastfeeding.
High Dose Vitamin A Pregnancy And Lactation Text: High dose vitamin A therapy is contraindicated during pregnancy and breast feeding.
Azathioprine Pregnancy And Lactation Text: This medication is Pregnancy Category D and isn't considered safe during pregnancy. It is unknown if this medication is excreted in breast milk.
Xeljanz Counseling: I discussed with the patient the risks of Xeljanz therapy including increased risk of infection, liver issues, headache, diarrhea, or cold symptoms. Live vaccines should be avoided. They were instructed to call if they have any problems.
Qbrexza Pregnancy And Lactation Text: There is no available data on Qbrexza use in pregnant women.  There is no available data on Qbrexza use in lactation.
Cellcept Counseling:  I discussed with the patient the risks of mycophenolate mofetil including but not limited to infection/immunosuppression, GI upset, hypokalemia, hypercholesterolemia, bone marrow suppression, lymphoproliferative disorders, malignancy, GI ulceration/bleed/perforation, colitis, interstitial lung disease, kidney failure, progressive multifocal leukoencephalopathy, and birth defects.  The patient understands that monitoring is required including a baseline creatinine and regular CBC testing. In addition, patient must alert us immediately if symptoms of infection or other concerning signs are noted.
Infliximab Counseling:  I discussed with the patient the risks of infliximab including but not limited to myelosuppression, immunosuppression, autoimmune hepatitis, demyelinating diseases, lymphoma, and serious infections.  The patient understands that monitoring is required including a PPD at baseline and must alert us or the primary physician if symptoms of infection or other concerning signs are noted.
Finasteride Male Counseling: Finasteride Counseling:  I discussed with the patient the risks of use of finasteride including but not limited to decreased libido, decreased ejaculate volume, gynecomastia, and depression. Women should not handle medication.  All of the patient's questions and concerns were addressed.
Rhofade Counseling: Rhofade is a topical medication which can decrease superficial blood flow where applied. Side effects are uncommon and include stinging, redness and allergic reactions.
SSKI Counseling:  I discussed with the patient the risks of SSKI including but not limited to thyroid abnormalities, metallic taste, GI upset, fever, headache, acne, arthralgias, paraesthesias, lymphadenopathy, easy bleeding, arrhythmias, and allergic reaction.
Clofazimine Counseling:  I discussed with the patient the risks of clofazimine including but not limited to skin and eye pigmentation, liver damage, nausea/vomiting, gastrointestinal bleeding and allergy.
Glycopyrrolate Pregnancy And Lactation Text: This medication is Pregnancy Category B and is considered safe during pregnancy. It is unknown if it is excreted breast milk.
Topical Steroids Counseling: I discussed with the patient that prolonged use of topical steroids can result in the increased appearance of superficial blood vessels (telangiectasias), lightening (hypopigmentation) and thinning of the skin (atrophy).  Patient understands to avoid using high potency steroids in skin folds, the groin or the face.  The patient verbalized understanding of the proper use and possible adverse effects of topical steroids.  All of the patient's questions and concerns were addressed.
Erythromycin Counseling:  I discussed with the patient the risks of erythromycin including but not limited to GI upset, allergic reaction, drug rash, diarrhea, increase in liver enzymes, and yeast infections.
Azithromycin Pregnancy And Lactation Text: This medication is considered safe during pregnancy and is also secreted in breast milk.
Erivedge Counseling- I discussed with the patient the risks of Erivedge including but not limited to nausea, vomiting, diarrhea, constipation, weight loss, changes in the sense of taste, decreased appetite, muscle spasms, and hair loss.  The patient verbalized understanding of the proper use and possible adverse effects of Erivedge.  All of the patient's questions and concerns were addressed.
Stelara Counseling:  I discussed with the patient the risks of ustekinumab including but not limited to immunosuppression, malignancy, posterior leukoencephalopathy syndrome, and serious infections.  The patient understands that monitoring is required including a PPD at baseline and must alert us or the primary physician if symptoms of infection or other concerning signs are noted.
Xelkavehz Pregnancy And Lactation Text: This medication is Pregnancy Category D and is not considered safe during pregnancy.  The risk during breast feeding is also uncertain.
Mirvaso Pregnancy And Lactation Text: This medication has not been assigned a Pregnancy Risk Category. It is unknown if the medication is excreted in breast milk.
Zoryve Counseling:  I discussed with the patient that Zoryve is not for use in the eyes, mouth or vagina. The most commonly reported side effects include diarrhea, headache, insomnia, application site pain, upper respiratory tract infections, and urinary tract infections.  All of the patient's questions and concerns were addressed.
Hydroxyzine Counseling: Patient advised that the medication is sedating and not to drive a car after taking this medication.  Patient informed of potential adverse effects including but not limited to dry mouth, urinary retention, and blurry vision.  The patient verbalized understanding of the proper use and possible adverse effects of hydroxyzine.  All of the patient's questions and concerns were addressed.
Erythromycin Pregnancy And Lactation Text: This medication is Pregnancy Category B and is considered safe during pregnancy. It is also excreted in breast milk.
Hydroxychloroquine Counseling:  I discussed with the patient that a baseline ophthalmologic exam is needed at the start of therapy and every year thereafter while on therapy. A CBC may also be warranted for monitoring.  The side effects of this medication were discussed with the patient, including but not limited to agranulocytosis, aplastic anemia, seizures, rashes, retinopathy, and liver toxicity. Patient instructed to call the office should any adverse effect occur.  The patient verbalized understanding of the proper use and possible adverse effects of Plaquenil.  All the patient's questions and concerns were addressed.
Olanzapine Pregnancy And Lactation Text: This medication is pregnancy category C.   There are no adequate and well controlled trials with olanzapine in pregnant females.  Olanzapine should be used during pregnancy only if the potential benefit justifies the potential risk to the fetus.   In a study in lactating healthy women, olanzapine was excreted in breast milk.  It is recommended that women taking olanzapine should not breast feed.
Bimzelx Counseling:  I discussed with the patient the risks of Bimzelx including but not limited to depression, immunosuppression, allergic reactions and infections.  The patient understands that monitoring is required including a PPD at baseline and must alert us or the primary physician if symptoms of infection or other concerning signs are noted.
Calcipotriene Counseling:  I discussed with the patient the risks of calcipotriene including but not limited to erythema, scaling, itching, and irritation.
Sarecycline Counseling: Patient advised regarding possible photosensitivity and discoloration of the teeth, skin, lips, tongue and gums.  Patient instructed to avoid sunlight, if possible.  When exposed to sunlight, patients should wear protective clothing, sunglasses, and sunscreen.  The patient was instructed to call the office immediately if the following severe adverse effects occur:  hearing changes, easy bruising/bleeding, severe headache, or vision changes.  The patient verbalized understanding of the proper use and possible adverse effects of sarecycline.  All of the patient's questions and concerns were addressed.
Dupixent Pregnancy And Lactation Text: This medication likely crosses the placenta but the risk for the fetus is uncertain. This medication is excreted in breast milk.
Sski Pregnancy And Lactation Text: This medication is Pregnancy Category D and isn't considered safe during pregnancy. It is excreted in breast milk.
Cibinqo Counseling: I discussed with the patient the risks of Cibinqo therapy including but not limited to common cold, nausea, headache, cold sores, increased blood CPK levels, dizziness, UTIs, fatigue, acne, and vomitting. Live vaccines should be avoided.  This medication has been linked to serious infections; higher rate of mortality; malignancy and lymphoproliferative disorders; major adverse cardiovascular events; thrombosis; thrombocytopenia and lymphopenia; lipid elevations; and retinal detachment.
Litfulo Counseling: I discussed with the patient the risks of Litfulo therapy including but not limited to upper respiratory tract infections, shingles, cold sores, and nausea. Live vaccines should be avoided.  This medication has been linked to serious infections; higher rate of mortality; malignancy and lymphoproliferative disorders; major adverse cardiovascular events; thrombosis; gastrointestinal perforations; neutropenia; lymphopenia; anemia; liver enzyme elevations; and lipid elevations.
Bimzelx Pregnancy And Lactation Text: This medication crosses the placenta and the safety is uncertain during pregnancy. It is unknown if this medication is present in breast milk.
Cantharidin Counseling:  I discussed with the patient the risks of Cantharidin including but not limited to pain, redness, burning, itching, and blistering.
Finasteride Female Counseling: Finasteride Counseling:  I discussed with the patient the risks of use of finasteride including but not limited to decreased libido and sexual dysfunction. Explained the teratogenic nature of the medication and stressed the importance of not getting pregnant during treatment. All of the patient's questions and concerns were addressed.
Calcipotriene Pregnancy And Lactation Text: The use of this medication during pregnancy or lactation is not recommended as there is insufficient data.
Topical Steroids Applications Pregnancy And Lactation Text: Most topical steroids are considered safe to use during pregnancy and lactation.  Any topical steroid applied to the breast or nipple should be washed off before breastfeeding.
Hydroquinone Counseling:  Patient advised that medication may result in skin irritation, lightening (hypopigmentation), dryness, and burning.  In the event of skin irritation, the patient was advised to reduce the amount of the drug applied or use it less frequently.  Rarely, spots that are treated with hydroquinone can become darker (pseudoochronosis).  Should this occur, patient instructed to stop medication and call the office. The patient verbalized understanding of the proper use and possible adverse effects of hydroquinone.  All of the patient's questions and concerns were addressed.
Bactrim Counseling:  I discussed with the patient the risks of sulfa antibiotics including but not limited to GI upset, allergic reaction, drug rash, diarrhea, dizziness, photosensitivity, and yeast infections.  Rarely, more serious reactions can occur including but not limited to aplastic anemia, agranulocytosis, methemoglobinemia, blood dyscrasias, liver or kidney failure, lung infiltrates or desquamative/blistering drug rashes.
Hydroxyzine Pregnancy And Lactation Text: This medication is not safe during pregnancy and should not be taken. It is also excreted in breast milk and breast feeding isn't recommended.
Metronidazole Counseling:  I discussed with the patient the risks of metronidazole including but not limited to seizures, nausea/vomiting, a metallic taste in the mouth, nausea/vomiting and severe allergy.
Hydroxychloroquine Pregnancy And Lactation Text: This medication has been shown to cause fetal harm but it isn't assigned a Pregnancy Risk Category. There are small amounts excreted in breast milk.
Aklief counseling:  Patient advised to apply a pea-sized amount only at bedtime and wait 30 minutes after washing their face before applying.  If too drying, patient may add a non-comedogenic moisturizer.  The most commonly reported side effects including irritation, redness, scaling, dryness, stinging, burning, itching, and increased risk of sunburn.  The patient verbalized understanding of the proper use and possible adverse effects of retinoids.  All of the patient's questions and concerns were addressed.
Finasteride Pregnancy And Lactation Text: This medication is absolutely contraindicated during pregnancy. It is unknown if it is excreted in breast milk.
Cantharidin Pregnancy And Lactation Text: This medication has not been proven safe during pregnancy. It is unknown if this medication is excreted in breast milk.
Libtayo Counseling- I discussed with the patient the risks of Libtayo including but not limited to nausea, vomiting, diarrhea, and bone or muscle pain.  The patient verbalized understanding of the proper use and possible adverse effects of Libtayo.  All of the patient's questions and concerns were addressed.
Rituxan Counseling:  I discussed with the patient the risks of Rituxan infusions. Side effects can include infusion reactions, severe drug rashes including mucocutaneous reactions, reactivation of latent hepatitis and other infections and rarely progressive multifocal leukoencephalopathy.  All of the patient's questions and concerns were addressed.
Topical Sulfur Applications Counseling: Topical Sulfur Counseling: Patient counseled that this medication may cause skin irritation or allergic reactions.  In the event of skin irritation, the patient was advised to reduce the amount of the drug applied or use it less frequently.   The patient verbalized understanding of the proper use and possible adverse effects of topical sulfur application.  All of the patient's questions and concerns were addressed.
Acitretin Counseling:  I discussed with the patient the risks of acitretin including but not limited to hair loss, dry lips/skin/eyes, liver damage, hyperlipidemia, depression/suicidal ideation, photosensitivity.  Serious rare side effects can include but are not limited to pancreatitis, pseudotumor cerebri, bony changes, clot formation/stroke/heart attack.  Patient understands that alcohol is contraindicated since it can result in liver toxicity and significantly prolong the elimination of the drug by many years.
Oral Minoxidil Counseling- I discussed with the patient the risks of oral minoxidil including but not limited to shortness of breath, swelling of the feet or ankles, dizziness, lightheadedness, unwanted hair growth and allergic reaction.  The patient verbalized understanding of the proper use and possible adverse effects of oral minoxidil.  All of the patient's questions and concerns were addressed.
Enbrel Counseling:  I discussed with the patient the risks of etanercept including but not limited to myelosuppression, immunosuppression, autoimmune hepatitis, demyelinating diseases, lymphoma, and infections.  The patient understands that monitoring is required including a PPD at baseline and must alert us or the primary physician if symptoms of infection or other concerning signs are noted.
Ketoconazole Counseling:   Patient counseled regarding improving absorption with orange juice.  Adverse effects include but are not limited to breast enlargement, headache, diarrhea, nausea, upset stomach, liver function test abnormalities, taste disturbance, and stomach pain.  There is a rare possibility of liver failure that can occur when taking ketoconazole. The patient understands that monitoring of LFTs may be required, especially at baseline. The patient verbalized understanding of the proper use and possible adverse effects of ketoconazole.  All of the patient's questions and concerns were addressed.
Litfulo Pregnancy And Lactation Text: Based on animal studies, Lifulo may cause embryo-fetal harm when administered to pregnant women.  The medication should not be used in pregnancy.  Breastfeeding is not recommended during treatment.
Opzelura Counseling:  I discussed with the patient the risks of Opzelura including but not limited to nasopharngitis, bronchitis, ear infection, eosinophila, hives, diarrhea, folliculitis, tonsillitis, and rhinorrhea.  Taken orally, this medication has been linked to serious infections; higher rate of mortality; malignancy and lymphoproliferative disorders; major adverse cardiovascular events; thrombosis; thrombocytopenia, anemia, and neutropenia; and lipid elevations.
5-Fu Counseling: 5-Fluorouracil Counseling:  I discussed with the patient the risks of 5-fluorouracil including but not limited to erythema, scaling, itching, weeping, crusting, and pain.
Tetracycline Counseling: Patient counseled regarding possible photosensitivity and increased risk for sunburn.  Patient instructed to avoid sunlight, if possible.  When exposed to sunlight, patients should wear protective clothing, sunglasses, and sunscreen.  The patient was instructed to call the office immediately if the following severe adverse effects occur:  hearing changes, easy bruising/bleeding, severe headache, or vision changes.  The patient verbalized understanding of the proper use and possible adverse effects of tetracycline.  All of the patient's questions and concerns were addressed. Patient understands to avoid pregnancy while on therapy due to potential birth defects.
Solaraze Counseling:  I discussed with the patient the risks of Solaraze including but not limited to erythema, scaling, itching, weeping, crusting, and pain.
Cibinqo Pregnancy And Lactation Text: It is unknown if this medication will adversely affect pregnancy or breast feeding.  You should not take this medication if you are currently pregnant or planning a pregnancy or while breastfeeding.
Bactrim Pregnancy And Lactation Text: This medication is Pregnancy Category D and is known to cause fetal risk.  It is also excreted in breast milk.
Tazorac Pregnancy And Lactation Text: This medication is not safe during pregnancy. It is unknown if this medication is excreted in breast milk.
Zyclara Counseling:  I discussed with the patient the risks of imiquimod including but not limited to erythema, scaling, itching, weeping, crusting, and pain.  Patient understands that the inflammatory response to imiquimod is variable from person to person and was educated regarded proper titration schedule.  If flu-like symptoms develop, patient knows to discontinue the medication and contact us.
Oral Minoxidil Pregnancy And Lactation Text: This medication should only be used when clearly needed if you are pregnant, attempting to become pregnant or breast feeding.
Thalidomide Counseling: I discussed with the patient the risks of thalidomide including but not limited to birth defects, anxiety, weakness, chest pain, dizziness, cough and severe allergy.
Cyclophosphamide Counseling:  I discussed with the patient the risks of cyclophosphamide including but not limited to hair loss, hormonal abnormalities, decreased fertility, abdominal pain, diarrhea, nausea and vomiting, bone marrow suppression and infection. The patient understands that monitoring is required while taking this medication.
Colchicine Counseling:  Patient counseled regarding adverse effects including but not limited to stomach upset (nausea, vomiting, stomach pain, or diarrhea).  Patient instructed to limit alcohol consumption while taking this medication.  Colchicine may reduce blood counts especially with prolonged use.  The patient understands that monitoring of kidney function and blood counts may be required, especially at baseline. The patient verbalized understanding of the proper use and possible adverse effects of colchicine.  All of the patient's questions and concerns were addressed.
Imiquimod Counseling:  I discussed with the patient the risks of imiquimod including but not limited to erythema, scaling, itching, weeping, crusting, and pain.  Patient understands that the inflammatory response to imiquimod is variable from person to person and was educated regarded proper titration schedule.  If flu-like symptoms develop, patient knows to discontinue the medication and contact us.
Birth Control Pills Counseling: Birth Control Pill Counseling: I discussed with the patient the potential side effects of OCPs including but not limited to increased risk of stroke, heart attack, thrombophlebitis, deep venous thrombosis, hepatic adenomas, breast changes, GI upset, headaches, and depression.  The patient verbalized understanding of the proper use and possible adverse effects of OCPs. All of the patient's questions and concerns were addressed.
Low Dose Naltrexone Counseling- I discussed with the patient the potential risks and side effects of low dose naltrexone including but not limited to: more vivid dreams, headaches, nausea, vomiting, abdominal pain, fatigue, dizziness, and anxiety.
Topical Sulfur Applications Pregnancy And Lactation Text: This medication is considered safe during pregnancy and breast feeding secondary to limited systemic absorption.
Ketoconazole Pregnancy And Lactation Text: This medication is Pregnancy Category C and it isn't know if it is safe during pregnancy. It is also excreted in breast milk and breast feeding isn't recommended.
Metronidazole Pregnancy And Lactation Text: This medication is Pregnancy Category B and considered safe during pregnancy.  It is also excreted in breast milk.
Libtayo Pregnancy And Lactation Text: This medication is contraindicated in pregnancy and when breast feeding.
Opzelura Pregnancy And Lactation Text: There is insufficient data to evaluate drug-associated risk for major birth defects, miscarriage, or other adverse maternal or fetal outcomes.  There is a pregnancy registry that monitors pregnancy outcomes in pregnant persons exposed to the medication during pregnancy.  It is unknown if this medication is excreted in breast milk.  Do not breastfeed during treatment and for about 4 weeks after the last dose.
Taltz Counseling: I discussed with the patient the risks of ixekizumab including but not limited to immunosuppression, serious infections, worsening of inflammatory bowel disease and drug reactions.  The patient understands that monitoring is required including a PPD at baseline and must alert us or the primary physician if symptoms of infection or other concerning signs are noted.
Aklief Pregnancy And Lactation Text: It is unknown if this medication is safe to use during pregnancy.  It is unknown if this medication is excreted in breast milk.  Breastfeeding women should use the topical cream on the smallest area of the skin for the shortest time needed while breastfeeding.  Do not apply to nipple and areola.
Acitretin Pregnancy And Lactation Text: This medication is Pregnancy Category X and should not be given to women who are pregnant or may become pregnant in the future. This medication is excreted in breast milk.
Olumiant Counseling: I discussed with the patient the risks of Olumiant therapy including but not limited to upper respiratory tract infections, shingles, cold sores, and nausea. Live vaccines should be avoided.  This medication has been linked to serious infections; higher rate of mortality; malignancy and lymphoproliferative disorders; major adverse cardiovascular events; thrombosis; gastrointestinal perforations; neutropenia; lymphopenia; anemia; liver enzyme elevations; and lipid elevations.
Otezla Counseling: The side effects of Otezla were discussed with the patient, including but not limited to worsening or new depression, weight loss, diarrhea, nausea, upper respiratory tract infection, and headache. Patient instructed to call the office should any adverse effect occur.  The patient verbalized understanding of the proper use and possible adverse effects of Otezla.  All the patient's questions and concerns were addressed.
Terbinafine Counseling: Patient counseling regarding adverse effects of terbinafine including but not limited to headache, diarrhea, rash, upset stomach, liver function test abnormalities, itching, taste/smell disturbance, nausea, abdominal pain, and flatulence.  There is a rare possibility of liver failure that can occur when taking terbinafine.  The patient understands that a baseline LFT and kidney function test may be required. The patient verbalized understanding of the proper use and possible adverse effects of terbinafine.  All of the patient's questions and concerns were addressed.
Picato Counseling:  I discussed with the patient the risks of Picato including but not limited to erythema, scaling, itching, weeping, crusting, and pain.
Minocycline Counseling: Patient advised regarding possible photosensitivity and discoloration of the teeth, skin, lips, tongue and gums.  Patient instructed to avoid sunlight, if possible.  When exposed to sunlight, patients should wear protective clothing, sunglasses, and sunscreen.  The patient was instructed to call the office immediately if the following severe adverse effects occur:  hearing changes, easy bruising/bleeding, severe headache, or vision changes.  The patient verbalized understanding of the proper use and possible adverse effects of minocycline.  All of the patient's questions and concerns were addressed.
Azelaic Acid Counseling: Patient counseled that medicine may cause skin irritation and to avoid applying near the eyes.  In the event of skin irritation, the patient was advised to reduce the amount of the drug applied or use it less frequently.   The patient verbalized understanding of the proper use and possible adverse effects of azelaic acid.  All of the patient's questions and concerns were addressed.
Cephalexin Counseling: I counseled the patient regarding use of cephalexin as an antibiotic for prophylactic and/or therapeutic purposes. Cephalexin (commonly prescribed under brand name Keflex) is a cephalosporin antibiotic which is active against numerous classes of bacteria, including most skin bacteria. Side effects may include nausea, diarrhea, gastrointestinal upset, rash, hives, yeast infections, and in rare cases, hepatitis, kidney disease, seizures, fever, confusion, neurologic symptoms, and others. Patients with severe allergies to penicillin medications are cautioned that there is about a 10% incidence of cross-reactivity with cephalosporins. When possible, patients with penicillin allergies should use alternatives to cephalosporins for antibiotic therapy.
Rituxan Pregnancy And Lactation Text: This medication is Pregnancy Category C and it isn't know if it is safe during pregnancy. It is unknown if this medication is excreted in breast milk but similar antibodies are known to be excreted.
Albendazole Counseling:  I discussed with the patient the risks of albendazole including but not limited to cytopenia, kidney damage, nausea/vomiting and severe allergy.  The patient understands that this medication is being used in an off-label manner.
Solaraze Pregnancy And Lactation Text: This medication is Pregnancy Category B and is considered safe. There is some data to suggest avoiding during the third trimester. It is unknown if this medication is excreted in breast milk.
Cyclophosphamide Pregnancy And Lactation Text: This medication is Pregnancy Category D and it isn't considered safe during pregnancy. This medication is excreted in breast milk.
Topical Clindamycin Counseling: Patient counseled that this medication may cause skin irritation or allergic reactions.  In the event of skin irritation, the patient was advised to reduce the amount of the drug applied or use it less frequently.   The patient verbalized understanding of the proper use and possible adverse effects of clindamycin.  All of the patient's questions and concerns were addressed.
Humira Counseling:  I discussed with the patient the risks of adalimumab including but not limited to myelosuppression, immunosuppression, autoimmune hepatitis, demyelinating diseases, lymphoma, and serious infections.  The patient understands that monitoring is required including a PPD at baseline and must alert us or the primary physician if symptoms of infection or other concerning signs are noted.
Dapsone Counseling: I discussed with the patient the risks of dapsone including but not limited to hemolytic anemia, agranulocytosis, rashes, methemoglobinemia, kidney failure, peripheral neuropathy, headaches, GI upset, and liver toxicity.  Patients who start dapsone require monitoring including baseline LFTs and weekly CBCs for the first month, then every month thereafter.  The patient verbalized understanding of the proper use and possible adverse effects of dapsone.  All of the patient's questions and concerns were addressed.
Cyclosporine Counseling:  I discussed with the patient the risks of cyclosporine including but not limited to hypertension, gingival hyperplasia,myelosuppression, immunosuppression, liver damage, kidney damage, neurotoxicity, lymphoma, and serious infections. The patient understands that monitoring is required including baseline blood pressure, CBC, CMP, lipid panel and uric acid, and then 1-2 times monthly CMP and blood pressure.
Cephalexin Pregnancy And Lactation Text: This medication is Pregnancy Category B and considered safe during pregnancy.  It is also excreted in breast milk but can be used safely for shorter doses.
Siliq Counseling:  I discussed with the patient the risks of Siliq including but not limited to new or worsening depression, suicidal thoughts and behavior, immunosuppression, malignancy, posterior leukoencephalopathy syndrome, and serious infections.  The patient understands that monitoring is required including a PPD at baseline and must alert us or the primary physician if symptoms of infection or other concerning signs are noted. There is also a special program designed to monitor depression which is required with Siliq.
Soolantra Counseling: I discussed with the patients the risks of topial Soolantra. This is a medicine which decreases the number of mites and inflammation in the skin. You experience burning, stinging, eye irritation or allergic reactions.  Please call our office if you develop any problems from using this medication.
Olumiant Pregnancy And Lactation Text: Based on animal studies, Olumiant may cause embryo-fetal harm when administered to pregnant women.  The medication should not be used in pregnancy.  Breastfeeding is not recommended during treatment.
Drysol Counseling:  I discussed with the patient the risks of drysol/aluminum chloride including but not limited to skin rash, itching, irritation, burning.
Detail Level: Simple
Birth Control Pills Pregnancy And Lactation Text: This medication should be avoided if pregnant and for the first 30 days post-partum.
Low Dose Naltrexone Pregnancy And Lactation Text: Naltrexone is pregnancy category C.  There have been no adequate and well-controlled studies in pregnant women.  It should be used in pregnancy only if the potential benefit justifies the potential risk to the fetus.   Limited data indicates that naltrexone is minimally excreted into breastmilk.
Odomzo Counseling- I discussed with the patient the risks of Odomzo including but not limited to nausea, vomiting, diarrhea, constipation, weight loss, changes in the sense of taste, decreased appetite, muscle spasms, and hair loss.  The patient verbalized understanding of the proper use and possible adverse effects of Odomzo.  All of the patient's questions and concerns were addressed.
Bexarotene Counseling:  I discussed with the patient the risks of bexarotene including but not limited to hair loss, dry lips/skin/eyes, liver abnormalities, hyperlipidemia, pancreatitis, depression/suicidal ideation, photosensitivity, drug rash/allergic reactions, hypothyroidism, anemia, leukopenia, infection, cataracts, and teratogenicity.  Patient understands that they will need regular blood tests to check lipid profile, liver function tests, white blood cell count, thyroid function tests and pregnancy test if applicable.
Tranexamic Acid Counseling:  Patient advised of the small risk of bleeding problems with tranexamic acid. They were also instructed to call if they developed any nausea, vomiting or diarrhea. All of the patient's questions and concerns were addressed.
Wartpeel Counseling:  I discussed with the patient the risks of Wartpeel including but not limited to erythema, scaling, itching, weeping, crusting, and pain.

## 2024-06-10 NOTE — PROCEDURE: DEFER
Detail Level: Detailed
X Size Of Lesion In Cm (Optional): 0
Scheduling Instructions (Optional): 30. minutes early to numb, place in room with hyfercator ready to go. 1 unit.
Introduction Text (Please End With A Colon): The following procedure was deferred secondary to the need to use topical numbing over the areas since patients will be having areas removed with electrocautry but  doctor
Instructions (Optional): Quoted at $150 for removal of all skin tags and DPN.

## 2024-06-10 NOTE — PROCEDURE: RECOMMENDATIONS
Detail Level: Zone
Recommendations (Free Text): Recommended otc KP beauty body scrub and CeraVe SA
Recommendation Preamble: The following recommendations were made during the visit:
Render Risk Assessment In Note?: no

## 2024-06-18 ENCOUNTER — APPOINTMENT (OUTPATIENT)
Dept: URBAN - METROPOLITAN AREA CLINIC 207 | Age: 29
Setting detail: DERMATOLOGY
End: 2024-06-23

## 2024-06-18 DIAGNOSIS — Z41.9 ENCOUNTER FOR PROCEDURE FOR PURPOSES OTHER THAN REMEDYING HEALTH STATE, UNSPECIFIED: ICD-10-CM

## 2024-06-18 PROCEDURE — OTHER FACIAL: OTHER

## 2024-06-18 PROCEDURE — OTHER DERMAPLANE: OTHER

## 2024-06-18 ASSESSMENT — LOCATION DETAILED DESCRIPTION DERM
LOCATION DETAILED: INFERIOR MID FOREHEAD
LOCATION DETAILED: RIGHT INFERIOR CENTRAL MALAR CHEEK
LOCATION DETAILED: RIGHT LOWER CUTANEOUS LIP
LOCATION DETAILED: LEFT INFERIOR CENTRAL MALAR CHEEK
LOCATION DETAILED: RIGHT CHIN

## 2024-06-18 ASSESSMENT — LOCATION SIMPLE DESCRIPTION DERM
LOCATION SIMPLE: RIGHT CHEEK
LOCATION SIMPLE: INFERIOR FOREHEAD
LOCATION SIMPLE: CHIN
LOCATION SIMPLE: RIGHT LIP
LOCATION SIMPLE: LEFT CHEEK

## 2024-06-18 ASSESSMENT — LOCATION ZONE DERM
LOCATION ZONE: LIP
LOCATION ZONE: FACE

## 2024-06-18 NOTE — PROCEDURE: DERMAPLANE
Post-Care Instructions: I reviewed with the patient in detail post-care instructions.
Price (Use Numbers Only, No Special Characters Or $): 50
Post-Procedure Instructions: Following the dermaplane procedure, revision soothing facial rinse was applied to the treatment areas. Moisturizer and SPF was applied.
Treatment Areas: face and neck
Detail Level: Zone
Pre-Procedure Text: The patient was placed in a recumbant position on the procedure table.
Blade: 10R blade scalpel

## 2024-06-18 NOTE — PROCEDURE: FACIAL
Treatment Type (Optional): Anti Aging Facial
Led Light (Optional): red
Detail Level: Zone
Treatment Serum Override: hydrinity restorative serum
Facial Steaming: steamed
Exfoliation Type: dermaplane
Extraction Method: BT-Micro
Price (Use Numbers Only, No Special Characters Or $): 175
Mask Type (Optional): papaya

## 2024-10-31 ENCOUNTER — RX ONLY (RX ONLY)
Age: 29
End: 2024-10-31

## 2024-10-31 RX ORDER — HYDROQUINONE 6% 6 G/100G
EMULSION TOPICAL
Qty: 30 | Refills: 0 | Status: ERX

## 2024-11-01 ENCOUNTER — APPOINTMENT (OUTPATIENT)
Dept: URBAN - METROPOLITAN AREA CLINIC 207 | Age: 29
Setting detail: DERMATOLOGY
End: 2024-11-02

## 2024-11-01 DIAGNOSIS — Z41.9 ENCOUNTER FOR PROCEDURE FOR PURPOSES OTHER THAN REMEDYING HEALTH STATE, UNSPECIFIED: ICD-10-CM

## 2024-11-01 PROCEDURE — OTHER DERMAPLANE: OTHER

## 2024-11-01 PROCEDURE — OTHER FACIAL: OTHER

## 2024-11-01 ASSESSMENT — LOCATION SIMPLE DESCRIPTION DERM
LOCATION SIMPLE: CHIN
LOCATION SIMPLE: RIGHT CHEEK
LOCATION SIMPLE: LEFT FOREHEAD
LOCATION SIMPLE: LEFT CHEEK

## 2024-11-01 ASSESSMENT — LOCATION DETAILED DESCRIPTION DERM
LOCATION DETAILED: LEFT INFERIOR MEDIAL FOREHEAD
LOCATION DETAILED: RIGHT INFERIOR CENTRAL MALAR CHEEK
LOCATION DETAILED: LEFT INFERIOR CENTRAL MALAR CHEEK
LOCATION DETAILED: LEFT CHIN
LOCATION DETAILED: LEFT MEDIAL FOREHEAD

## 2024-11-01 ASSESSMENT — LOCATION ZONE DERM: LOCATION ZONE: FACE

## 2024-11-01 NOTE — PROCEDURE: DERMAPLANE
Detail Level: Zone
Post-Procedure Instructions: Following the dermaplane procedure, revision soothing facial rinse was applied to the treatment areas. Moisturizer and SPF was applied.
Treatment Areas: face and neck
Price (Use Numbers Only, No Special Characters Or $): 0
Post-Care Instructions: I reviewed with the patient in detail post-care instructions.
Pre-Procedure Text: The patient was placed in a recumbant position on the procedure table.
Blade: 10R blade scalpel

## 2024-11-01 NOTE — PROCEDURE: FACIAL
Detail Level: Zone
Price (Use Numbers Only, No Special Characters Or $): 0
Exfoliation Type: dermaplane
Treatment Serum Override: hydrinity restorative serum
Mask Type (Optional): papaya
Treatment Type (Optional): Anti Aging Facial
Facial Steaming: steamed
Extraction Method: cotton-tipped applicator

## 2025-01-24 ENCOUNTER — RX ONLY (RX ONLY)
Age: 30
End: 2025-01-24

## 2025-01-24 RX ORDER — CLASCOTERONE 1 G/100G
CREAM TOPICAL TWICE DAILY
Qty: 60 | Refills: 8 | Status: ERX

## 2025-02-03 ENCOUNTER — RX ONLY (RX ONLY)
Age: 30
End: 2025-02-03

## 2025-02-03 RX ORDER — HYDROQUINONE 6% 6 G/100G
EMULSION TOPICAL
Qty: 30 | Refills: 0 | Status: ERX

## 2025-02-04 ENCOUNTER — APPOINTMENT (OUTPATIENT)
Dept: URBAN - METROPOLITAN AREA CLINIC 207 | Age: 30
Setting detail: DERMATOLOGY
End: 2025-02-04

## 2025-02-04 DIAGNOSIS — Z41.9 ENCOUNTER FOR PROCEDURE FOR PURPOSES OTHER THAN REMEDYING HEALTH STATE, UNSPECIFIED: ICD-10-CM

## 2025-02-04 PROCEDURE — OTHER DERMAPLANE: OTHER

## 2025-02-04 PROCEDURE — OTHER FACIAL: OTHER

## 2025-02-04 ASSESSMENT — LOCATION DETAILED DESCRIPTION DERM
LOCATION DETAILED: RIGHT INFERIOR CENTRAL MALAR CHEEK
LOCATION DETAILED: LEFT CHIN
LOCATION DETAILED: LEFT CENTRAL MALAR CHEEK
LOCATION DETAILED: INFERIOR MID FOREHEAD
LOCATION DETAILED: LEFT INFERIOR MEDIAL FOREHEAD
LOCATION DETAILED: RIGHT CHIN
LOCATION DETAILED: LEFT INFERIOR CENTRAL MALAR CHEEK

## 2025-02-04 ASSESSMENT — LOCATION SIMPLE DESCRIPTION DERM
LOCATION SIMPLE: LEFT FOREHEAD
LOCATION SIMPLE: CHIN
LOCATION SIMPLE: LEFT CHEEK
LOCATION SIMPLE: INFERIOR FOREHEAD
LOCATION SIMPLE: RIGHT CHEEK

## 2025-02-04 ASSESSMENT — LOCATION ZONE DERM: LOCATION ZONE: FACE

## 2025-02-04 NOTE — PROCEDURE: FACIAL
Led Light (Optional): red
Detail Level: Zone
Treatment Type (Optional): Anti Aging Facial
Facial Steaming: steamed
Mask Type (Optional): papaya
Extraction Method: cotton-tipped applicator
Treatment Serum Override: hydrinity restorative serum
Exfoliation Type: dermaplane
Price (Use Numbers Only, No Special Characters Or $): 0

## 2025-02-04 NOTE — PROCEDURE: DERMAPLANE
Price (Use Numbers Only, No Special Characters Or $): 0
Post-Procedure Instructions: Following the dermaplane procedure, revision soothing facial rinse was applied to the treatment areas. Moisturizer and SPF was applied.
Detail Level: Zone
Blade: 10R blade scalpel
Pre-Procedure Text: The patient was placed in a recumbant position on the procedure table.
Post-Care Instructions: I reviewed with the patient in detail post-care instructions.
Treatment Areas: face and neck

## 2025-04-04 ENCOUNTER — APPOINTMENT (OUTPATIENT)
Dept: URBAN - METROPOLITAN AREA CLINIC 207 | Age: 30
Setting detail: DERMATOLOGY
End: 2025-04-10

## 2025-04-04 DIAGNOSIS — Z41.9 ENCOUNTER FOR PROCEDURE FOR PURPOSES OTHER THAN REMEDYING HEALTH STATE, UNSPECIFIED: ICD-10-CM

## 2025-04-04 PROCEDURE — OTHER FACIAL: OTHER

## 2025-04-04 PROCEDURE — OTHER DERMAPLANE: OTHER

## 2025-04-04 ASSESSMENT — LOCATION SIMPLE DESCRIPTION DERM
LOCATION SIMPLE: LEFT CHEEK
LOCATION SIMPLE: LEFT FOREHEAD
LOCATION SIMPLE: RIGHT CHEEK
LOCATION SIMPLE: INFERIOR FOREHEAD
LOCATION SIMPLE: CHIN
LOCATION SIMPLE: LEFT LIP

## 2025-04-04 ASSESSMENT — LOCATION DETAILED DESCRIPTION DERM
LOCATION DETAILED: INFERIOR MID FOREHEAD
LOCATION DETAILED: LEFT INFERIOR CENTRAL MALAR CHEEK
LOCATION DETAILED: RIGHT CENTRAL MALAR CHEEK
LOCATION DETAILED: LEFT INFERIOR MEDIAL FOREHEAD
LOCATION DETAILED: LEFT LOWER CUTANEOUS LIP
LOCATION DETAILED: LEFT MENTAL CREASE
LOCATION DETAILED: LEFT CENTRAL MALAR CHEEK

## 2025-04-04 ASSESSMENT — LOCATION ZONE DERM
LOCATION ZONE: LIP
LOCATION ZONE: FACE

## 2025-04-04 NOTE — PROCEDURE: FACIAL
Mask Type (Optional): papaya
Facial Steaming: steamed
Extraction Method: BT-Micro
Detail Level: Zone
Treatment Type (Optional): Deep Cleansing Facial
Price (Use Numbers Only, No Special Characters Or $): 0
Led Light (Optional): red
Treatment Serum Override: hydrinity restorative serum
Exfoliation Type: dermaplane

## 2025-04-04 NOTE — PROCEDURE: DERMAPLANE
Treatment Areas: face and neck
Post-Procedure Instructions: Following the dermaplane procedure, revision soothing facial rinse was applied to the treatment areas. Moisturizer and SPF was applied.
Blade: 10R blade scalpel
Post-Care Instructions: I reviewed with the patient in detail post-care instructions.
Pre-Procedure Text: The patient was placed in a recumbant position on the procedure table.
Price (Use Numbers Only, No Special Characters Or $): 0
Detail Level: Zone

## 2025-06-18 ENCOUNTER — RX ONLY (RX ONLY)
Age: 30
End: 2025-06-18

## 2025-06-18 ENCOUNTER — APPOINTMENT (OUTPATIENT)
Dept: URBAN - METROPOLITAN AREA CLINIC 207 | Age: 30
Setting detail: DERMATOLOGY
End: 2025-06-19

## 2025-06-18 DIAGNOSIS — L81.0 POSTINFLAMMATORY HYPERPIGMENTATION: ICD-10-CM

## 2025-06-18 DIAGNOSIS — L70.0 ACNE VULGARIS: ICD-10-CM

## 2025-06-18 PROCEDURE — OTHER MIPS QUALITY: OTHER

## 2025-06-18 PROCEDURE — 99213 OFFICE O/P EST LOW 20 MIN: CPT

## 2025-06-18 PROCEDURE — OTHER ADDITIONAL NOTES: OTHER

## 2025-06-18 PROCEDURE — OTHER RECOMMENDATIONS: OTHER

## 2025-06-18 PROCEDURE — OTHER PRESCRIPTION MEDICATION MANAGEMENT: OTHER

## 2025-06-18 PROCEDURE — OTHER COUNSELING: OTHER

## 2025-06-18 PROCEDURE — OTHER MEDICATION COUNSELING: OTHER

## 2025-06-18 RX ORDER — HYDROQUINONE 6% 6 G/100G
EMULSION TOPICAL
Qty: 30 | Refills: 0 | Status: ERX

## 2025-06-18 RX ORDER — CLASCOTERONE 1 G/100G
CREAM TOPICAL TWICE DAILY
Qty: 60 | Refills: 8 | Status: ERX

## 2025-06-18 RX ORDER — TRETIONIN 0.5 MG/G
CREAM TOPICAL QHS
Qty: 45 | Refills: 6 | Status: ERX

## 2025-06-18 RX ORDER — CLINDAMYCIN PHOSPHATE AND BENZOYL PEROXIDE 1 %-5 %
KIT TOPICAL
Qty: 50 | Refills: 4 | Status: ERX

## 2025-06-24 ENCOUNTER — APPOINTMENT (OUTPATIENT)
Dept: URBAN - METROPOLITAN AREA CLINIC 206 | Age: 30
Setting detail: DERMATOLOGY
End: 2025-06-27

## 2025-06-24 DIAGNOSIS — Z41.9 ENCOUNTER FOR PROCEDURE FOR PURPOSES OTHER THAN REMEDYING HEALTH STATE, UNSPECIFIED: ICD-10-CM

## 2025-06-24 PROCEDURE — OTHER DERMAPLANE: OTHER

## 2025-06-24 PROCEDURE — OTHER SIGNATURE HYDRAFACIAL: OTHER

## 2025-06-24 ASSESSMENT — LOCATION DETAILED DESCRIPTION DERM: LOCATION DETAILED: LEFT INFERIOR MEDIAL FOREHEAD

## 2025-06-24 ASSESSMENT — LOCATION ZONE DERM: LOCATION ZONE: FACE

## 2025-06-24 ASSESSMENT — LOCATION SIMPLE DESCRIPTION DERM: LOCATION SIMPLE: LEFT FOREHEAD
